# Patient Record
Sex: FEMALE | Race: WHITE
[De-identification: names, ages, dates, MRNs, and addresses within clinical notes are randomized per-mention and may not be internally consistent; named-entity substitution may affect disease eponyms.]

---

## 2017-06-15 ENCOUNTER — APPOINTMENT (OUTPATIENT)
Dept: INTERNAL MEDICINE | Facility: CLINIC | Age: 32
End: 2017-06-15

## 2017-06-15 ENCOUNTER — OUTPATIENT (OUTPATIENT)
Dept: OUTPATIENT SERVICES | Facility: HOSPITAL | Age: 32
LOS: 1 days | Discharge: HOME | End: 2017-06-15

## 2017-06-15 VITALS
BODY MASS INDEX: 26.96 KG/M2 | HEIGHT: 69 IN | SYSTOLIC BLOOD PRESSURE: 132 MMHG | HEART RATE: 91 BPM | DIASTOLIC BLOOD PRESSURE: 86 MMHG | WEIGHT: 182 LBS

## 2017-06-15 DIAGNOSIS — F43.10 POST-TRAUMATIC STRESS DISORDER, UNSPECIFIED: ICD-10-CM

## 2017-06-15 DIAGNOSIS — F31.9 BIPOLAR DISORDER, UNSPECIFIED: ICD-10-CM

## 2017-06-15 DIAGNOSIS — F41.9 ANXIETY DISORDER, UNSPECIFIED: ICD-10-CM

## 2017-06-15 DIAGNOSIS — Z78.9 OTHER SPECIFIED HEALTH STATUS: ICD-10-CM

## 2017-06-15 DIAGNOSIS — F11.20 OPIOID DEPENDENCE, UNCOMPLICATED: ICD-10-CM

## 2017-06-15 DIAGNOSIS — F17.200 NICOTINE DEPENDENCE, UNSPECIFIED, UNCOMPLICATED: ICD-10-CM

## 2017-06-15 DIAGNOSIS — Z87.898 PERSONAL HISTORY OF OTHER SPECIFIED CONDITIONS: ICD-10-CM

## 2017-06-28 DIAGNOSIS — I51.7 CARDIOMEGALY: ICD-10-CM

## 2017-11-03 ENCOUNTER — OUTPATIENT (OUTPATIENT)
Dept: OUTPATIENT SERVICES | Facility: HOSPITAL | Age: 32
LOS: 1 days | Discharge: HOME | End: 2017-11-03

## 2017-11-03 DIAGNOSIS — F31.9 BIPOLAR DISORDER, UNSPECIFIED: ICD-10-CM

## 2017-11-03 DIAGNOSIS — F17.200 NICOTINE DEPENDENCE, UNSPECIFIED, UNCOMPLICATED: ICD-10-CM

## 2017-11-03 DIAGNOSIS — F43.10 POST-TRAUMATIC STRESS DISORDER, UNSPECIFIED: ICD-10-CM

## 2017-11-03 DIAGNOSIS — F11.20 OPIOID DEPENDENCE, UNCOMPLICATED: ICD-10-CM

## 2017-11-03 DIAGNOSIS — F41.9 ANXIETY DISORDER, UNSPECIFIED: ICD-10-CM

## 2017-11-03 DIAGNOSIS — Z00.8 ENCOUNTER FOR OTHER GENERAL EXAMINATION: ICD-10-CM

## 2017-11-29 ENCOUNTER — OUTPATIENT (OUTPATIENT)
Dept: OUTPATIENT SERVICES | Facility: HOSPITAL | Age: 32
LOS: 1 days | Discharge: HOME | End: 2017-11-29

## 2017-11-29 DIAGNOSIS — F17.200 NICOTINE DEPENDENCE, UNSPECIFIED, UNCOMPLICATED: ICD-10-CM

## 2017-11-29 DIAGNOSIS — I49.9 CARDIAC ARRHYTHMIA, UNSPECIFIED: ICD-10-CM

## 2017-11-29 DIAGNOSIS — F43.10 POST-TRAUMATIC STRESS DISORDER, UNSPECIFIED: ICD-10-CM

## 2017-11-29 DIAGNOSIS — F11.20 OPIOID DEPENDENCE, UNCOMPLICATED: ICD-10-CM

## 2017-11-29 DIAGNOSIS — F41.9 ANXIETY DISORDER, UNSPECIFIED: ICD-10-CM

## 2017-11-29 DIAGNOSIS — F31.9 BIPOLAR DISORDER, UNSPECIFIED: ICD-10-CM

## 2018-01-11 ENCOUNTER — OUTPATIENT (OUTPATIENT)
Dept: OUTPATIENT SERVICES | Facility: HOSPITAL | Age: 33
LOS: 1 days | Discharge: HOME | End: 2018-01-11

## 2018-01-11 ENCOUNTER — APPOINTMENT (OUTPATIENT)
Dept: INTERNAL MEDICINE | Facility: CLINIC | Age: 33
End: 2018-01-11

## 2018-01-11 VITALS
SYSTOLIC BLOOD PRESSURE: 121 MMHG | HEIGHT: 69 IN | WEIGHT: 180 LBS | BODY MASS INDEX: 26.66 KG/M2 | DIASTOLIC BLOOD PRESSURE: 77 MMHG | HEART RATE: 93 BPM

## 2018-01-11 DIAGNOSIS — F11.20 OPIOID DEPENDENCE, UNCOMPLICATED: ICD-10-CM

## 2018-01-11 DIAGNOSIS — R94.31 ABNORMAL ELECTROCARDIOGRAM [ECG] [EKG]: ICD-10-CM

## 2018-01-11 DIAGNOSIS — F31.9 BIPOLAR DISORDER, UNSPECIFIED: ICD-10-CM

## 2018-01-11 DIAGNOSIS — F43.10 POST-TRAUMATIC STRESS DISORDER, UNSPECIFIED: ICD-10-CM

## 2018-01-11 DIAGNOSIS — F17.200 NICOTINE DEPENDENCE, UNSPECIFIED, UNCOMPLICATED: ICD-10-CM

## 2018-01-11 DIAGNOSIS — F41.9 ANXIETY DISORDER, UNSPECIFIED: ICD-10-CM

## 2018-10-19 ENCOUNTER — OUTPATIENT (OUTPATIENT)
Dept: OUTPATIENT SERVICES | Facility: HOSPITAL | Age: 33
LOS: 1 days | Discharge: HOME | End: 2018-10-19

## 2018-10-19 DIAGNOSIS — Z00.8 ENCOUNTER FOR OTHER GENERAL EXAMINATION: ICD-10-CM

## 2018-10-26 ENCOUNTER — OUTPATIENT (OUTPATIENT)
Dept: OUTPATIENT SERVICES | Facility: HOSPITAL | Age: 33
LOS: 1 days | Discharge: HOME | End: 2018-10-26

## 2018-10-26 DIAGNOSIS — I49.9 CARDIAC ARRHYTHMIA, UNSPECIFIED: ICD-10-CM

## 2018-10-26 LAB
ALBUMIN SERPL ELPH-MCNC: 4.3 G/DL — SIGNIFICANT CHANGE UP (ref 3.5–5.2)
ALP SERPL-CCNC: 46 U/L — SIGNIFICANT CHANGE UP (ref 30–115)
ALT FLD-CCNC: 16 U/L — SIGNIFICANT CHANGE UP (ref 0–41)
ANION GAP SERPL CALC-SCNC: 15 MMOL/L — HIGH (ref 7–14)
APPEARANCE UR: CLEAR — SIGNIFICANT CHANGE UP
AST SERPL-CCNC: 15 U/L — SIGNIFICANT CHANGE UP (ref 0–41)
BILIRUB DIRECT SERPL-MCNC: <0.2 MG/DL — SIGNIFICANT CHANGE UP (ref 0–0.2)
BILIRUB INDIRECT FLD-MCNC: 0 MG/DL — SIGNIFICANT CHANGE UP (ref 0.2–1.2)
BILIRUB SERPL-MCNC: <0.2 MG/DL — SIGNIFICANT CHANGE UP (ref 0.2–1.2)
BILIRUB UR-MCNC: NEGATIVE — SIGNIFICANT CHANGE UP
BUN SERPL-MCNC: 17 MG/DL — SIGNIFICANT CHANGE UP (ref 10–20)
CALCIUM SERPL-MCNC: 8.7 MG/DL — SIGNIFICANT CHANGE UP (ref 8.5–10.1)
CHLORIDE SERPL-SCNC: 105 MMOL/L — SIGNIFICANT CHANGE UP (ref 98–110)
CHOLEST SERPL-MCNC: 196 MG/DL — SIGNIFICANT CHANGE UP (ref 100–200)
CO2 SERPL-SCNC: 22 MMOL/L — SIGNIFICANT CHANGE UP (ref 17–32)
COLOR SPEC: YELLOW — SIGNIFICANT CHANGE UP
CREAT SERPL-MCNC: 0.8 MG/DL — SIGNIFICANT CHANGE UP (ref 0.7–1.5)
DIFF PNL FLD: NEGATIVE — SIGNIFICANT CHANGE UP
ESTIMATED AVERAGE GLUCOSE: 108 MG/DL — SIGNIFICANT CHANGE UP (ref 68–114)
GLUCOSE SERPL-MCNC: 103 MG/DL — HIGH (ref 70–99)
GLUCOSE UR QL: NEGATIVE MG/DL — SIGNIFICANT CHANGE UP
HBA1C BLD-MCNC: 5.4 % — SIGNIFICANT CHANGE UP (ref 4–5.6)
HCG UR QL: NEGATIVE — SIGNIFICANT CHANGE UP
HCT VFR BLD CALC: 32.3 % — LOW (ref 37–47)
HDLC SERPL-MCNC: 59 MG/DL — SIGNIFICANT CHANGE UP
HGB BLD-MCNC: 10.1 G/DL — LOW (ref 12–16)
KETONES UR-MCNC: ABNORMAL
LEUKOCYTE ESTERASE UR-ACNC: NEGATIVE — SIGNIFICANT CHANGE UP
LIPID PNL WITH DIRECT LDL SERPL: 120 MG/DL — SIGNIFICANT CHANGE UP (ref 4–129)
MAGNESIUM SERPL-MCNC: 1.9 MG/DL — SIGNIFICANT CHANGE UP (ref 1.8–2.4)
MCHC RBC-ENTMCNC: 25.1 PG — LOW (ref 27–31)
MCHC RBC-ENTMCNC: 31.3 G/DL — LOW (ref 32–37)
MCV RBC AUTO: 80.3 FL — LOW (ref 81–99)
NITRITE UR-MCNC: NEGATIVE — SIGNIFICANT CHANGE UP
NRBC # BLD: 0 /100 WBCS — SIGNIFICANT CHANGE UP (ref 0–0)
PH UR: 5.5 — SIGNIFICANT CHANGE UP (ref 5–8)
PLATELET # BLD AUTO: 299 K/UL — SIGNIFICANT CHANGE UP (ref 130–400)
POTASSIUM SERPL-MCNC: 3.9 MMOL/L — SIGNIFICANT CHANGE UP (ref 3.5–5)
POTASSIUM SERPL-SCNC: 3.9 MMOL/L — SIGNIFICANT CHANGE UP (ref 3.5–5)
PROT SERPL-MCNC: 7 G/DL — SIGNIFICANT CHANGE UP (ref 6–8)
PROT UR-MCNC: NEGATIVE MG/DL — SIGNIFICANT CHANGE UP
RBC # BLD: 4.02 M/UL — LOW (ref 4.2–5.4)
RBC # FLD: 15 % — HIGH (ref 11.5–14.5)
SODIUM SERPL-SCNC: 142 MMOL/L — SIGNIFICANT CHANGE UP (ref 135–146)
SP GR SPEC: >=1.03 (ref 1.01–1.03)
TOTAL CHOLESTEROL/HDL RATIO MEASUREMENT: 3.3 RATIO — LOW (ref 4–5.5)
TRIGL SERPL-MCNC: 133 MG/DL — SIGNIFICANT CHANGE UP (ref 10–149)
UROBILINOGEN FLD QL: 0.2 MG/DL — SIGNIFICANT CHANGE UP (ref 0.2–0.2)
WBC # BLD: 7.68 K/UL — SIGNIFICANT CHANGE UP (ref 4.8–10.8)
WBC # FLD AUTO: 7.68 K/UL — SIGNIFICANT CHANGE UP (ref 4.8–10.8)

## 2018-10-27 LAB
HAV IGM SER-ACNC: SIGNIFICANT CHANGE UP
HBV CORE IGM SER-ACNC: SIGNIFICANT CHANGE UP
HBV SURFACE AG SER-ACNC: SIGNIFICANT CHANGE UP
HCV AB S/CO SERPL IA: 0.2 S/CO — SIGNIFICANT CHANGE UP
HCV AB SERPL-IMP: SIGNIFICANT CHANGE UP
T PALLIDUM AB TITR SER: NEGATIVE — SIGNIFICANT CHANGE UP

## 2018-11-12 ENCOUNTER — OUTPATIENT (OUTPATIENT)
Dept: OUTPATIENT SERVICES | Facility: HOSPITAL | Age: 33
LOS: 1 days | Discharge: HOME | End: 2018-11-12

## 2018-11-12 DIAGNOSIS — I49.9 CARDIAC ARRHYTHMIA, UNSPECIFIED: ICD-10-CM

## 2018-12-20 ENCOUNTER — APPOINTMENT (OUTPATIENT)
Dept: INTERNAL MEDICINE | Facility: CLINIC | Age: 33
End: 2018-12-20

## 2018-12-20 ENCOUNTER — OUTPATIENT (OUTPATIENT)
Dept: OUTPATIENT SERVICES | Facility: HOSPITAL | Age: 33
LOS: 1 days | Discharge: HOME | End: 2018-12-20

## 2018-12-20 VITALS
SYSTOLIC BLOOD PRESSURE: 126 MMHG | TEMPERATURE: 98.3 F | HEART RATE: 94 BPM | WEIGHT: 166 LBS | HEIGHT: 69 IN | DIASTOLIC BLOOD PRESSURE: 80 MMHG | BODY MASS INDEX: 24.59 KG/M2

## 2018-12-20 DIAGNOSIS — I51.7 CARDIOMEGALY: ICD-10-CM

## 2018-12-20 DIAGNOSIS — Z00.00 ENCOUNTER FOR GENERAL ADULT MEDICAL EXAMINATION W/OUT ABNORMAL FINDINGS: ICD-10-CM

## 2018-12-20 DIAGNOSIS — F11.20 OPIOID DEPENDENCE, UNCOMPLICATED: ICD-10-CM

## 2018-12-20 NOTE — ASSESSMENT
[FreeTextEntry1] : 32 female with history of opioid abuse on methadone presenting for routine f/u. Last seen on 01/11/2018 for EKG abnormalities. Patient currently denies any complaints.\par \par #) LVH\par -patient saw cardiologist 2 years, nothing to do as patient is asymptomatic. Get Echo. \par \par #) H/o opioid abuse\par -on methadone, goes to clinic\par \par #) HCM\par -BP- 126/80\par -flu shot refused\par -pap smear UTD- 2018\par -syphilis screening- negative\par -HIV screening sent\par -cbc, cmp (10/26/2018)- wnl\par -lipid profile (10/26/2018)- wnl\par -A1c (10/26/2018)- 5.4\par -Hep A, B, C- negative\par -routine b/w ordered- a1c, lipid profile, tsh, and vitamin d\par -RTC in 1 year

## 2018-12-20 NOTE — PHYSICAL EXAM
[Normal] : no CVA tenderness, no spinal tenderness [de-identified] : denies homicidal/ suicidal ideations

## 2018-12-20 NOTE — HISTORY OF PRESENT ILLNESS
[de-identified] : 32 female with history of opioid abuse on methadone presenting for routine f/u. Last seen on 01/11/2018 for EKG abnormalities. Patient currently denies any complaints.\par \par Patient denies any fevers, chills, night sweats, and unintentional weight loss. Patient denies any chest pain, shortness of breath, and palpitations. Patient denies any nausea, vomiting, and diarrhea.

## 2019-10-18 ENCOUNTER — OUTPATIENT (OUTPATIENT)
Dept: OUTPATIENT SERVICES | Facility: HOSPITAL | Age: 34
LOS: 1 days | Discharge: HOME | End: 2019-10-18

## 2019-10-18 DIAGNOSIS — Z00.8 ENCOUNTER FOR OTHER GENERAL EXAMINATION: ICD-10-CM

## 2019-11-01 LAB
ALBUMIN SERPL ELPH-MCNC: 4.6 G/DL — SIGNIFICANT CHANGE UP (ref 3.5–5.2)
ALP SERPL-CCNC: 44 U/L — SIGNIFICANT CHANGE UP (ref 30–115)
ALT FLD-CCNC: 9 U/L — SIGNIFICANT CHANGE UP (ref 0–41)
ANION GAP SERPL CALC-SCNC: 13 MMOL/L — SIGNIFICANT CHANGE UP (ref 7–14)
APPEARANCE UR: CLEAR — SIGNIFICANT CHANGE UP
AST SERPL-CCNC: 14 U/L — SIGNIFICANT CHANGE UP (ref 0–41)
BILIRUB SERPL-MCNC: <0.2 MG/DL — SIGNIFICANT CHANGE UP (ref 0.2–1.2)
BILIRUB UR-MCNC: NEGATIVE — SIGNIFICANT CHANGE UP
BUN SERPL-MCNC: 10 MG/DL — SIGNIFICANT CHANGE UP (ref 10–20)
CALCIUM SERPL-MCNC: 8.4 MG/DL — LOW (ref 8.5–10.1)
CHLORIDE SERPL-SCNC: 102 MMOL/L — SIGNIFICANT CHANGE UP (ref 98–110)
CHOLEST SERPL-MCNC: 194 MG/DL — SIGNIFICANT CHANGE UP (ref 100–200)
CO2 SERPL-SCNC: 25 MMOL/L — SIGNIFICANT CHANGE UP (ref 17–32)
COLOR SPEC: YELLOW — SIGNIFICANT CHANGE UP
CREAT SERPL-MCNC: 0.8 MG/DL — SIGNIFICANT CHANGE UP (ref 0.7–1.5)
DIFF PNL FLD: NEGATIVE — SIGNIFICANT CHANGE UP
GLUCOSE SERPL-MCNC: 109 MG/DL — HIGH (ref 70–99)
GLUCOSE UR QL: NEGATIVE MG/DL — SIGNIFICANT CHANGE UP
HCG UR QL: NEGATIVE — SIGNIFICANT CHANGE UP
HCT VFR BLD CALC: 33 % — LOW (ref 37–47)
HDLC SERPL-MCNC: 59 MG/DL — SIGNIFICANT CHANGE UP
HGB BLD-MCNC: 10.1 G/DL — LOW (ref 12–16)
KETONES UR-MCNC: NEGATIVE — SIGNIFICANT CHANGE UP
LEUKOCYTE ESTERASE UR-ACNC: NEGATIVE — SIGNIFICANT CHANGE UP
LIPID PNL WITH DIRECT LDL SERPL: 104 MG/DL — SIGNIFICANT CHANGE UP (ref 4–129)
MAGNESIUM SERPL-MCNC: 2 MG/DL — SIGNIFICANT CHANGE UP (ref 1.8–2.4)
MCHC RBC-ENTMCNC: 24.8 PG — LOW (ref 27–31)
MCHC RBC-ENTMCNC: 30.6 G/DL — LOW (ref 32–37)
MCV RBC AUTO: 81.1 FL — SIGNIFICANT CHANGE UP (ref 81–99)
NITRITE UR-MCNC: NEGATIVE — SIGNIFICANT CHANGE UP
NRBC # BLD: 0 /100 WBCS — SIGNIFICANT CHANGE UP (ref 0–0)
PH UR: 6.5 — SIGNIFICANT CHANGE UP (ref 5–8)
PLATELET # BLD AUTO: 276 K/UL — SIGNIFICANT CHANGE UP (ref 130–400)
POTASSIUM SERPL-MCNC: 3.6 MMOL/L — SIGNIFICANT CHANGE UP (ref 3.5–5)
POTASSIUM SERPL-SCNC: 3.6 MMOL/L — SIGNIFICANT CHANGE UP (ref 3.5–5)
PROT SERPL-MCNC: 7.3 G/DL — SIGNIFICANT CHANGE UP (ref 6–8)
PROT UR-MCNC: NEGATIVE MG/DL — SIGNIFICANT CHANGE UP
RBC # BLD: 4.07 M/UL — LOW (ref 4.2–5.4)
RBC # FLD: 15.8 % — HIGH (ref 11.5–14.5)
SODIUM SERPL-SCNC: 140 MMOL/L — SIGNIFICANT CHANGE UP (ref 135–146)
SP GR SPEC: 1.01 — SIGNIFICANT CHANGE UP (ref 1.01–1.03)
TOTAL CHOLESTEROL/HDL RATIO MEASUREMENT: 3.3 RATIO — LOW (ref 4–5.5)
TRIGL SERPL-MCNC: 299 MG/DL — HIGH (ref 10–149)
UROBILINOGEN FLD QL: 0.2 MG/DL — SIGNIFICANT CHANGE UP (ref 0.2–0.2)
WBC # BLD: 5.65 K/UL — SIGNIFICANT CHANGE UP (ref 4.8–10.8)
WBC # FLD AUTO: 5.65 K/UL — SIGNIFICANT CHANGE UP (ref 4.8–10.8)

## 2019-11-02 LAB
HAV IGM SER-ACNC: SIGNIFICANT CHANGE UP
HBV CORE IGM SER-ACNC: SIGNIFICANT CHANGE UP
HBV SURFACE AG SER-ACNC: SIGNIFICANT CHANGE UP
HCV AB S/CO SERPL IA: 0.22 S/CO — SIGNIFICANT CHANGE UP (ref 0–0.99)
HCV AB SERPL-IMP: SIGNIFICANT CHANGE UP
T PALLIDUM AB TITR SER: NEGATIVE — SIGNIFICANT CHANGE UP

## 2019-11-05 LAB
ESTIMATED AVERAGE GLUCOSE: 105 MG/DL — SIGNIFICANT CHANGE UP (ref 68–114)
HBA1C BLD-MCNC: 5.3 % — SIGNIFICANT CHANGE UP (ref 4–5.6)

## 2019-11-15 ENCOUNTER — OUTPATIENT (OUTPATIENT)
Dept: OUTPATIENT SERVICES | Facility: HOSPITAL | Age: 34
LOS: 1 days | Discharge: HOME | End: 2019-11-15

## 2019-11-15 DIAGNOSIS — I49.9 CARDIAC ARRHYTHMIA, UNSPECIFIED: ICD-10-CM

## 2020-10-16 ENCOUNTER — OUTPATIENT (OUTPATIENT)
Dept: OUTPATIENT SERVICES | Facility: HOSPITAL | Age: 35
LOS: 1 days | Discharge: HOME | End: 2020-10-16

## 2020-10-16 DIAGNOSIS — Z00.8 ENCOUNTER FOR OTHER GENERAL EXAMINATION: ICD-10-CM

## 2020-10-29 LAB
A1C WITH ESTIMATED AVERAGE GLUCOSE RESULT: 5.4 % — SIGNIFICANT CHANGE UP (ref 4–5.6)
ALBUMIN SERPL ELPH-MCNC: 4.5 G/DL — SIGNIFICANT CHANGE UP (ref 3.5–5.2)
ALP SERPL-CCNC: 44 U/L — SIGNIFICANT CHANGE UP (ref 30–115)
ALT FLD-CCNC: 9 U/L — SIGNIFICANT CHANGE UP (ref 0–41)
ANION GAP SERPL CALC-SCNC: 12 MMOL/L — SIGNIFICANT CHANGE UP (ref 7–14)
APPEARANCE UR: CLEAR — SIGNIFICANT CHANGE UP
AST SERPL-CCNC: 13 U/L — SIGNIFICANT CHANGE UP (ref 0–41)
BILIRUB SERPL-MCNC: <0.2 MG/DL — SIGNIFICANT CHANGE UP (ref 0.2–1.2)
BILIRUB UR-MCNC: NEGATIVE — SIGNIFICANT CHANGE UP
BUN SERPL-MCNC: 12 MG/DL — SIGNIFICANT CHANGE UP (ref 10–20)
CALCIUM SERPL-MCNC: 9.1 MG/DL — SIGNIFICANT CHANGE UP (ref 8.5–10.1)
CHLORIDE SERPL-SCNC: 100 MMOL/L — SIGNIFICANT CHANGE UP (ref 98–110)
CHOLEST SERPL-MCNC: 216 MG/DL — HIGH
CO2 SERPL-SCNC: 26 MMOL/L — SIGNIFICANT CHANGE UP (ref 17–32)
COLOR SPEC: YELLOW — SIGNIFICANT CHANGE UP
CREAT SERPL-MCNC: 0.8 MG/DL — SIGNIFICANT CHANGE UP (ref 0.7–1.5)
DIFF PNL FLD: NEGATIVE — SIGNIFICANT CHANGE UP
ESTIMATED AVERAGE GLUCOSE: 108 MG/DL — SIGNIFICANT CHANGE UP (ref 68–114)
GLUCOSE SERPL-MCNC: 102 MG/DL — HIGH (ref 70–99)
GLUCOSE UR QL: NEGATIVE MG/DL — SIGNIFICANT CHANGE UP
HCG UR QL: NEGATIVE — SIGNIFICANT CHANGE UP
HCT VFR BLD CALC: 31.8 % — LOW (ref 37–47)
HDLC SERPL-MCNC: 61 MG/DL — SIGNIFICANT CHANGE UP
HGB BLD-MCNC: 9.4 G/DL — LOW (ref 12–16)
KETONES UR-MCNC: NEGATIVE — SIGNIFICANT CHANGE UP
LEUKOCYTE ESTERASE UR-ACNC: NEGATIVE — SIGNIFICANT CHANGE UP
LIPID PNL WITH DIRECT LDL SERPL: 137 MG/DL — HIGH
MAGNESIUM SERPL-MCNC: 1.9 MG/DL — SIGNIFICANT CHANGE UP (ref 1.8–2.4)
MCHC RBC-ENTMCNC: 23.6 PG — LOW (ref 27–31)
MCHC RBC-ENTMCNC: 29.6 G/DL — LOW (ref 32–37)
MCV RBC AUTO: 79.7 FL — LOW (ref 81–99)
NITRITE UR-MCNC: NEGATIVE — SIGNIFICANT CHANGE UP
NON HDL CHOLESTEROL: 155 MG/DL — HIGH
NRBC # BLD: 0 /100 WBCS — SIGNIFICANT CHANGE UP (ref 0–0)
PH UR: 6 — SIGNIFICANT CHANGE UP (ref 5–8)
PLATELET # BLD AUTO: 277 K/UL — SIGNIFICANT CHANGE UP (ref 130–400)
POTASSIUM SERPL-MCNC: 3.7 MMOL/L — SIGNIFICANT CHANGE UP (ref 3.5–5)
POTASSIUM SERPL-SCNC: 3.7 MMOL/L — SIGNIFICANT CHANGE UP (ref 3.5–5)
PROT SERPL-MCNC: 6.9 G/DL — SIGNIFICANT CHANGE UP (ref 6–8)
PROT UR-MCNC: NEGATIVE MG/DL — SIGNIFICANT CHANGE UP
RBC # BLD: 3.99 M/UL — LOW (ref 4.2–5.4)
RBC # FLD: 15.5 % — HIGH (ref 11.5–14.5)
SODIUM SERPL-SCNC: 138 MMOL/L — SIGNIFICANT CHANGE UP (ref 135–146)
SP GR SPEC: 1.02 — SIGNIFICANT CHANGE UP (ref 1.01–1.03)
TRIGL SERPL-MCNC: 143 MG/DL — SIGNIFICANT CHANGE UP
UROBILINOGEN FLD QL: 0.2 MG/DL — SIGNIFICANT CHANGE UP (ref 0.2–0.2)
WBC # BLD: 5.24 K/UL — SIGNIFICANT CHANGE UP (ref 4.8–10.8)
WBC # FLD AUTO: 5.24 K/UL — SIGNIFICANT CHANGE UP (ref 4.8–10.8)

## 2020-10-30 LAB
HAV IGM SER-ACNC: SIGNIFICANT CHANGE UP
HBV CORE IGM SER-ACNC: SIGNIFICANT CHANGE UP
HBV SURFACE AG SER-ACNC: SIGNIFICANT CHANGE UP
HCV AB S/CO SERPL IA: 0.1 S/CO — SIGNIFICANT CHANGE UP (ref 0–0.99)
HCV AB SERPL-IMP: SIGNIFICANT CHANGE UP

## 2020-10-31 LAB
GAMMA INTERFERON BACKGROUND BLD IA-ACNC: 0.01 IU/ML — SIGNIFICANT CHANGE UP
M TB IFN-G BLD-IMP: NEGATIVE — SIGNIFICANT CHANGE UP
M TB IFN-G CD4+ BCKGRND COR BLD-ACNC: 0 IU/ML — SIGNIFICANT CHANGE UP
M TB IFN-G CD4+CD8+ BCKGRND COR BLD-ACNC: 0 IU/ML — SIGNIFICANT CHANGE UP
QUANT TB PLUS MITOGEN MINUS NIL: 2.03 IU/ML — SIGNIFICANT CHANGE UP
T PALLIDUM AB TITR SER: NEGATIVE — SIGNIFICANT CHANGE UP

## 2020-11-11 ENCOUNTER — OUTPATIENT (OUTPATIENT)
Dept: OUTPATIENT SERVICES | Facility: HOSPITAL | Age: 35
LOS: 1 days | Discharge: HOME | End: 2020-11-11

## 2020-11-11 DIAGNOSIS — I49.9 CARDIAC ARRHYTHMIA, UNSPECIFIED: ICD-10-CM

## 2021-08-21 ENCOUNTER — EMERGENCY (EMERGENCY)
Facility: HOSPITAL | Age: 36
LOS: 0 days | Discharge: HOME | End: 2021-08-21
Attending: EMERGENCY MEDICINE | Admitting: EMERGENCY MEDICINE
Payer: SELF-PAY

## 2021-08-21 VITALS
HEART RATE: 94 BPM | TEMPERATURE: 100 F | HEIGHT: 69 IN | OXYGEN SATURATION: 100 % | WEIGHT: 175.05 LBS | SYSTOLIC BLOOD PRESSURE: 126 MMHG | RESPIRATION RATE: 18 BRPM | DIASTOLIC BLOOD PRESSURE: 66 MMHG

## 2021-08-21 DIAGNOSIS — Z20.822 CONTACT WITH AND (SUSPECTED) EXPOSURE TO COVID-19: ICD-10-CM

## 2021-08-21 DIAGNOSIS — R50.9 FEVER, UNSPECIFIED: ICD-10-CM

## 2021-08-21 DIAGNOSIS — Z86.16 PERSONAL HISTORY OF COVID-19: ICD-10-CM

## 2021-08-21 DIAGNOSIS — R11.2 NAUSEA WITH VOMITING, UNSPECIFIED: ICD-10-CM

## 2021-08-21 DIAGNOSIS — M79.10 MYALGIA, UNSPECIFIED SITE: ICD-10-CM

## 2021-08-21 LAB — SARS-COV-2 RNA SPEC QL NAA+PROBE: SIGNIFICANT CHANGE UP

## 2021-08-21 PROCEDURE — 99284 EMERGENCY DEPT VISIT MOD MDM: CPT

## 2021-08-21 RX ORDER — ACETAMINOPHEN 500 MG
650 TABLET ORAL ONCE
Refills: 0 | Status: DISCONTINUED | OUTPATIENT
Start: 2021-08-21 | End: 2021-08-21

## 2021-08-21 RX ORDER — SODIUM CHLORIDE 9 MG/ML
1000 INJECTION, SOLUTION INTRAVENOUS ONCE
Refills: 0 | Status: COMPLETED | OUTPATIENT
Start: 2021-08-21 | End: 2021-08-21

## 2021-08-21 RX ORDER — ONDANSETRON 8 MG/1
4 TABLET, FILM COATED ORAL ONCE
Refills: 0 | Status: COMPLETED | OUTPATIENT
Start: 2021-08-21 | End: 2021-08-21

## 2021-08-21 RX ORDER — KETOROLAC TROMETHAMINE 30 MG/ML
15 SYRINGE (ML) INJECTION ONCE
Refills: 0 | Status: DISCONTINUED | OUTPATIENT
Start: 2021-08-21 | End: 2021-08-21

## 2021-08-21 RX ORDER — ONDANSETRON 8 MG/1
4 TABLET, FILM COATED ORAL ONCE
Refills: 0 | Status: DISCONTINUED | OUTPATIENT
Start: 2021-08-21 | End: 2021-08-21

## 2021-08-21 RX ADMIN — ONDANSETRON 4 MILLIGRAM(S): 8 TABLET, FILM COATED ORAL at 02:18

## 2021-08-21 RX ADMIN — SODIUM CHLORIDE 1000 MILLILITER(S): 9 INJECTION, SOLUTION INTRAVENOUS at 02:18

## 2021-08-21 RX ADMIN — Medication 15 MILLIGRAM(S): at 02:17

## 2021-08-21 NOTE — ED PROVIDER NOTE - PROGRESS NOTE DETAILS
CP: Patient lying comfortable in bed and reports feeling better. CP: Patient feels better. Given strict return precautions. Patient verbalized understanding and is agreeable to plan.

## 2021-08-21 NOTE — ED PROVIDER NOTE - CARE PROVIDER_API CALL
Compa Roque)  Geriatric Medicine; Internal Medicine  [address] [place] [phone]  Phone: [phone]  Fax: [phone]  Follow Up Time: 1-3 Days

## 2021-08-21 NOTE — ED PROVIDER NOTE - NSFOLLOWUPINSTRUCTIONS_ED_ALL_ED_FT
COVID-19 Vaccine (1 Dose)    WHAT YOU NEED TO KNOW:    What do I need to know about the COVID-19 1-dose vaccine? The COVID-19 vaccine is a shot given to help prevent infection caused by the novel coronavirus. It can be given to adults 18 years or older. This vaccine has emergency use authorization. This means it is not officially approved but is being given because the benefits outweigh the risks. The vaccine does not contain the virus that causes COVID-19. This means you are not at risk for getting COVID-19 from the vaccine. Instead, the vaccine teaches your immune system to recognize the virus and produce antibodies to fight it. Your healthcare provider will tell you when the vaccine is available to you.    How is the vaccine given? The vaccine is given as 1 shot into a muscle in your shoulder area. Your provider will monitor you for about 15 minutes after the shot is given.    What are reasons I should wait to get the COVID-19 vaccine?   •You are sick or have a fever.      •You had COVID-19 and received monoclonal antibodies or convalescent plasma. Wait at least 90 days after the end of treatment to get the vaccine.      What do I need to tell my healthcare provider before I get the vaccine?   •You received a dose of a different COVID-19 vaccine. Your provider may recommend you get a second dose of the other vaccine instead to complete a 2-dose schedule.      •You have an allergy to any component (part) of the vaccine.      •You have a history of allergies.      •You have a bleeding disorder or take blood thinning medicine.      •Your immune system is weakened from a medical condition or from medicines such as chemotherapy or steroids.      •You know or think you are pregnant. Your provider may recommend you get the vaccine during pregnancy if you are at high risk for COVID-19. He or she may instead want you to wait until after your baby is born. Do not get a COVID-19 vaccine until you and your provider decide it is right for you.      •You are breastfeeding.      What happens after I get the vaccine? You will be considered fully vaccinated 2 weeks after you get the vaccine. This is how long your immune system needs to build a response strong enough to protect you. The following are guidelines to follow when you are fully vaccinated:  •Continue being careful until experts are sure a fully vaccinated person cannot get infected or pass the virus to others. Wear a face covering and stay 6 feet away from others when you are in public. Remember to wash your hands often.      •It is considered safe to gather indoors with others who are also fully vaccinated. Face coverings and social distancing are not needed in this case.      •If you are exposed to the virus, you do not need to isolate or get tested unless you develop symptoms.      What are the risks of the vaccine? This is a new vaccine. All side effects may not be known. The following are possible side effects:  •You may have an allergic reaction to the vaccine. The vaccine can cause a life-threatening allergic reaction called anaphylaxis. This is rare.      •The vaccine may cause mild symptoms, such as a fever, chills, headache, and muscle aches. You may also be tired, have nausea, or develop a fever.      •The area where you got the shot may be swollen, sore, red, or tender. This is usually mild and goes away in a few hours. You can apply a warm compress to the area. It may also help to move your arm around.      •The vaccine may not be as helpful if your immune system is weak.      •You may still get infected with the coronavirus after you receive the vaccine.      What else can I do to prevent coronavirus infection? Droplets are the most common way all coronaviruses spread. The virus spreads from person to person through talking, coughing, and sneezing. The best way to prevent infection is to avoid anyone who is infected, but this can be hard to do. An infected person can spread the virus before signs or symptoms begin, or even if signs or symptoms never develop. Continue to do the following to keep yourself and others safe, even if you have had the vaccination:    Prevent COVID-19 Infection     •A face covering is recommended when you are in public, such as grocery stores, pharmacies, and on mass transit. You may live in an area that has a face covering mandate. This means you must wear a covering in public. Check the laws in your area before you leave your home. Bring extra coverings with you.  How to Wear a Face Covering (Mask)           •Follow worldwide, national, and local social distancing guidelines. Social distancing means avoiding close physical contact so the virus cannot spread from one person to another. Keep at least 6 feet (2 meters) between you and others who do not live in your house. Also keep this distance from anyone who comes to your home, such as someone making a delivery.      •Wash your hands often throughout the day. Use hand  that contains alcohol if soap and water are not available. Do not touch your eyes, nose, or mouth without washing your hands first. Teach children how to wash their hands and use hand .  Handwashing           Call your local emergency number (911 in the [place]) if:   •You have signs of a severe allergic reaction, such as trouble breathing, hives, or wheezing.      •Your mouth and throat are swollen.      •You have chest pain or your heart is beating faster than normal for you.      •Your face is red or swollen.      •You have hives that spread over your body.      When should I call my doctor or the place where I got the vaccine?   •You have a fever.      •You have any other signs or symptoms that concern you or do not go away.      •You have increased pain, redness, or swelling around the area where the shot was given.      •You have questions or concerns about the COVID-19 vaccine.      CARE AGREEMENT:    You have the right to help plan your care. Learn about your health condition and how it may be treated. Discuss treatment options with your healthcare providers to decide what care you want to receive. You always have the right to refuse treatment.

## 2021-08-21 NOTE — ED PROVIDER NOTE - CLINICAL SUMMARY MEDICAL DECISION MAKING FREE TEXT BOX
35y female h/o coivd in march 2021 with f/c/myalgias/n/v 4d after first covid vax, PE as above, will provide supportive care, routine swab (unvaccinated bf with hot/cold flashes)

## 2021-08-21 NOTE — ED ADULT NURSE NOTE - NSIMPLEMENTINTERV_GEN_ALL_ED
Implemented All Universal Safety Interventions:  [place] to call system. Call bell, personal items and telephone within reach. Instruct patient to call for assistance. Room bathroom lighting operational. Non-slip footwear when patient is off stretcher. Physically safe environment: no spills, clutter or unnecessary equipment. Stretcher in lowest position, wheels locked, appropriate side rails in place.

## 2021-08-21 NOTE — ED ADULT NURSE NOTE - ALCOHOL PRE SCREEN (AUDIT - C)
"-- DO NOT REPLY / DO NOT REPLY ALL --  -- Message is from the [employer]--    COVID-19 Universal Screening: N/A - Not about scheduling    General Patient Message      Reason for Call: Patient is requesting a referral for Dr. Taylor Rosas for a colonoscopy. Please contact the patient. Caller Information       Type Contact Phone    10/01/2020 02:19 PM CDT Phone (Incoming) Janelle Mildred (Self) [phone] (H)          Alternative phone number: none    Turnaround time given to caller: ""This message will be sent to [facility] Provider's name]. The clinical team will fulfill your request as soon as they review your message. \""    " Statement Selected

## 2021-08-21 NOTE — ED PROVIDER NOTE - OBJECTIVE STATEMENT
36 yo female, no PMHx, presents with body aches x3 days. She received her first dose of Pfizer covid vaccine 3 days prior and has been feeling body aches, Tmax 100F, alleviated with Tylenol, no aggravating factors, associated with nausea and single episode of NBNB vomiting today. Denies chest pain, shortness of breath, headache, dizziness, abdominal pain, dysuria, diarrhea, sick contacts. 34 yo female, no PMHx, presents with body aches x3 days. She received her first dose of Pfizer covid vaccine 3 days prior and has been feeling body aches, Tmax 100F, alleviated with Tylenol, no aggravating factors, associated with nausea and single episode of NBNB vomiting today. Patient had covid 3/2021. Denies chest pain, shortness of breath, headache, dizziness, abdominal pain, dysuria, diarrhea, sick contacts.

## 2021-08-21 NOTE — ED PROVIDER NOTE - PATIENT PORTAL LINK FT
You can access the FollowMyHealth Patient Portal offered by [facility] by registering at the following website: [email or web address]. By joining [employer]’s FollowMyHealth portal, you will also be able to view your health information using other applications (apps) compatible with our system.

## 2021-08-21 NOTE — ED PROVIDER NOTE - NS ED ROS FT
GEN:  no fever, +chills, no generalized weakness, +body aches  NEURO:  no headache, no dizziness  ENT: no sore throat, no runny nose  CV:  no chest pain, no palpitations  RESP:  no sob, no cough  GI:  +nausea, +vomiting, no abdominal pain, no diarrhea  :  no dysuria, no urinary frequency, no hematuria  MSK:  no joint pain, no edema  SKIN:  no rash, no bruising

## 2021-09-17 ENCOUNTER — OUTPATIENT (OUTPATIENT)
Dept: OUTPATIENT SERVICES | Facility: HOSPITAL | Age: 36
LOS: 1 days | Discharge: HOME | End: 2021-09-17

## 2021-09-17 DIAGNOSIS — Z00.8 ENCOUNTER FOR OTHER GENERAL EXAMINATION: ICD-10-CM

## 2021-09-17 PROBLEM — Z78.9 OTHER SPECIFIED HEALTH STATUS: Chronic | Status: ACTIVE | Noted: 2021-08-21

## 2021-09-30 LAB
A1C WITH ESTIMATED AVERAGE GLUCOSE RESULT: 5.4 % — SIGNIFICANT CHANGE UP (ref 4–5.6)
ALBUMIN SERPL ELPH-MCNC: 4.4 G/DL — SIGNIFICANT CHANGE UP (ref 3.5–5.2)
ALP SERPL-CCNC: 41 U/L — SIGNIFICANT CHANGE UP (ref 30–115)
ALT FLD-CCNC: 11 U/L — SIGNIFICANT CHANGE UP (ref 0–41)
ANION GAP SERPL CALC-SCNC: 12 MMOL/L — SIGNIFICANT CHANGE UP (ref 7–14)
APPEARANCE UR: CLEAR — SIGNIFICANT CHANGE UP
AST SERPL-CCNC: 14 U/L — SIGNIFICANT CHANGE UP (ref 0–41)
BACTERIA # UR AUTO: ABNORMAL
BILIRUB SERPL-MCNC: <0.2 MG/DL — SIGNIFICANT CHANGE UP (ref 0.2–1.2)
BILIRUB UR-MCNC: NEGATIVE — SIGNIFICANT CHANGE UP
BUN SERPL-MCNC: 15 MG/DL — SIGNIFICANT CHANGE UP (ref 10–20)
CALCIUM SERPL-MCNC: 9 MG/DL — SIGNIFICANT CHANGE UP (ref 8.5–10.1)
CHLORIDE SERPL-SCNC: 101 MMOL/L — SIGNIFICANT CHANGE UP (ref 98–110)
CHOLEST SERPL-MCNC: 178 MG/DL — SIGNIFICANT CHANGE UP
CO2 SERPL-SCNC: 24 MMOL/L — SIGNIFICANT CHANGE UP (ref 17–32)
COD CRY URNS QL: NEGATIVE — SIGNIFICANT CHANGE UP
COLOR SPEC: YELLOW — SIGNIFICANT CHANGE UP
CREAT SERPL-MCNC: 0.9 MG/DL — SIGNIFICANT CHANGE UP (ref 0.7–1.5)
DIFF PNL FLD: NEGATIVE — SIGNIFICANT CHANGE UP
EPI CELLS # UR: ABNORMAL /HPF
ESTIMATED AVERAGE GLUCOSE: 108 MG/DL — SIGNIFICANT CHANGE UP (ref 68–114)
GLUCOSE SERPL-MCNC: 126 MG/DL — HIGH (ref 70–99)
GLUCOSE UR QL: NEGATIVE MG/DL — SIGNIFICANT CHANGE UP
GRAN CASTS # UR COMP ASSIST: NEGATIVE — SIGNIFICANT CHANGE UP
HCG UR QL: NEGATIVE — SIGNIFICANT CHANGE UP
HCT VFR BLD CALC: 30.5 % — LOW (ref 37–47)
HDLC SERPL-MCNC: 66 MG/DL — SIGNIFICANT CHANGE UP
HGB BLD-MCNC: 8.7 G/DL — LOW (ref 12–16)
HYALINE CASTS # UR AUTO: NEGATIVE — SIGNIFICANT CHANGE UP
KETONES UR-MCNC: NEGATIVE — SIGNIFICANT CHANGE UP
LEUKOCYTE ESTERASE UR-ACNC: ABNORMAL
LIPID PNL WITH DIRECT LDL SERPL: 102 MG/DL — HIGH
MAGNESIUM SERPL-MCNC: 2.2 MG/DL — SIGNIFICANT CHANGE UP (ref 1.8–2.4)
MCHC RBC-ENTMCNC: 21.8 PG — LOW (ref 27–31)
MCHC RBC-ENTMCNC: 28.5 G/DL — LOW (ref 32–37)
MCV RBC AUTO: 76.3 FL — LOW (ref 81–99)
NITRITE UR-MCNC: NEGATIVE — SIGNIFICANT CHANGE UP
NON HDL CHOLESTEROL: 112 MG/DL — SIGNIFICANT CHANGE UP
NRBC # BLD: 0 /100 WBCS — SIGNIFICANT CHANGE UP (ref 0–0)
PH UR: 6 — SIGNIFICANT CHANGE UP (ref 5–8)
PLATELET # BLD AUTO: 292 K/UL — SIGNIFICANT CHANGE UP (ref 130–400)
POTASSIUM SERPL-MCNC: 4.1 MMOL/L — SIGNIFICANT CHANGE UP (ref 3.5–5)
POTASSIUM SERPL-SCNC: 4.1 MMOL/L — SIGNIFICANT CHANGE UP (ref 3.5–5)
PROT SERPL-MCNC: 7.1 G/DL — SIGNIFICANT CHANGE UP (ref 6–8)
PROT UR-MCNC: NEGATIVE MG/DL — SIGNIFICANT CHANGE UP
RBC # BLD: 4 M/UL — LOW (ref 4.2–5.4)
RBC # FLD: 16.9 % — HIGH (ref 11.5–14.5)
RBC CASTS # UR COMP ASSIST: NEGATIVE — SIGNIFICANT CHANGE UP
SODIUM SERPL-SCNC: 137 MMOL/L — SIGNIFICANT CHANGE UP (ref 135–146)
SP GR SPEC: 1.02 — SIGNIFICANT CHANGE UP (ref 1.01–1.03)
TRI-PHOS CRY UR QL COMP ASSIST: NEGATIVE — SIGNIFICANT CHANGE UP
TRIGL SERPL-MCNC: 77 MG/DL — SIGNIFICANT CHANGE UP
URATE CRY FLD QL MICRO: NEGATIVE — SIGNIFICANT CHANGE UP
UROBILINOGEN FLD QL: 0.2 MG/DL — SIGNIFICANT CHANGE UP
WBC # BLD: 6.3 K/UL — SIGNIFICANT CHANGE UP (ref 4.8–10.8)
WBC # FLD AUTO: 6.3 K/UL — SIGNIFICANT CHANGE UP (ref 4.8–10.8)
WBC UR QL: SIGNIFICANT CHANGE UP /HPF

## 2021-10-01 LAB
HAV IGM SER-ACNC: SIGNIFICANT CHANGE UP
HBV CORE IGM SER-ACNC: SIGNIFICANT CHANGE UP
HBV SURFACE AG SER-ACNC: SIGNIFICANT CHANGE UP
HCV AB S/CO SERPL IA: 0.18 S/CO — SIGNIFICANT CHANGE UP (ref 0–0.99)
HCV AB SERPL-IMP: SIGNIFICANT CHANGE UP
T PALLIDUM AB TITR SER: NEGATIVE — SIGNIFICANT CHANGE UP

## 2021-10-02 LAB
GAMMA INTERFERON BACKGROUND BLD IA-ACNC: 0.01 IU/ML — SIGNIFICANT CHANGE UP
M TB IFN-G BLD-IMP: NEGATIVE — SIGNIFICANT CHANGE UP
M TB IFN-G CD4+ BCKGRND COR BLD-ACNC: 0 IU/ML — SIGNIFICANT CHANGE UP
M TB IFN-G CD4+CD8+ BCKGRND COR BLD-ACNC: 0 IU/ML — SIGNIFICANT CHANGE UP
QUANT TB PLUS MITOGEN MINUS NIL: 2.98 IU/ML — SIGNIFICANT CHANGE UP

## 2021-11-12 ENCOUNTER — OUTPATIENT (OUTPATIENT)
Dept: OUTPATIENT SERVICES | Facility: HOSPITAL | Age: 36
LOS: 1 days | Discharge: HOME | End: 2021-11-12

## 2021-11-12 DIAGNOSIS — I49.9 CARDIAC ARRHYTHMIA, UNSPECIFIED: ICD-10-CM

## 2021-11-26 ENCOUNTER — OUTPATIENT (OUTPATIENT)
Dept: OUTPATIENT SERVICES | Facility: HOSPITAL | Age: 36
LOS: 1 days | Discharge: HOME | End: 2021-11-26
Payer: COMMERCIAL

## 2021-11-26 DIAGNOSIS — I49.9 CARDIAC ARRHYTHMIA, UNSPECIFIED: ICD-10-CM

## 2021-11-26 PROCEDURE — 93010 ELECTROCARDIOGRAM REPORT: CPT

## 2022-09-30 ENCOUNTER — OUTPATIENT (OUTPATIENT)
Dept: OUTPATIENT SERVICES | Facility: HOSPITAL | Age: 37
LOS: 1 days | Discharge: HOME | End: 2022-09-30

## 2022-09-30 DIAGNOSIS — Z00.8 ENCOUNTER FOR OTHER GENERAL EXAMINATION: ICD-10-CM

## 2022-10-31 ENCOUNTER — OUTPATIENT (OUTPATIENT)
Dept: OUTPATIENT SERVICES | Facility: HOSPITAL | Age: 37
LOS: 1 days | Discharge: HOME | End: 2022-10-31

## 2022-10-31 DIAGNOSIS — Z00.8 ENCOUNTER FOR OTHER GENERAL EXAMINATION: ICD-10-CM

## 2022-10-31 LAB
A1C WITH ESTIMATED AVERAGE GLUCOSE RESULT: 5 % — SIGNIFICANT CHANGE UP (ref 4–5.6)
ALBUMIN SERPL ELPH-MCNC: 4.4 G/DL — SIGNIFICANT CHANGE UP (ref 3.5–5.2)
ALP SERPL-CCNC: 41 U/L — SIGNIFICANT CHANGE UP (ref 30–115)
ALT FLD-CCNC: 26 U/L — SIGNIFICANT CHANGE UP (ref 0–41)
ANION GAP SERPL CALC-SCNC: 13 MMOL/L — SIGNIFICANT CHANGE UP (ref 7–14)
APPEARANCE UR: CLEAR — SIGNIFICANT CHANGE UP
AST SERPL-CCNC: 26 U/L — SIGNIFICANT CHANGE UP (ref 0–41)
BILIRUB SERPL-MCNC: <0.2 MG/DL — SIGNIFICANT CHANGE UP (ref 0.2–1.2)
BILIRUB UR-MCNC: NEGATIVE — SIGNIFICANT CHANGE UP
BUN SERPL-MCNC: 21 MG/DL — HIGH (ref 10–20)
CALCIUM SERPL-MCNC: 9.1 MG/DL — SIGNIFICANT CHANGE UP (ref 8.4–10.5)
CHLORIDE SERPL-SCNC: 101 MMOL/L — SIGNIFICANT CHANGE UP (ref 98–110)
CHOLEST SERPL-MCNC: 175 MG/DL — SIGNIFICANT CHANGE UP
CO2 SERPL-SCNC: 25 MMOL/L — SIGNIFICANT CHANGE UP (ref 17–32)
COLOR SPEC: YELLOW — SIGNIFICANT CHANGE UP
CREAT SERPL-MCNC: 0.9 MG/DL — SIGNIFICANT CHANGE UP (ref 0.7–1.5)
DIFF PNL FLD: NEGATIVE — SIGNIFICANT CHANGE UP
EGFR: 86 ML/MIN/1.73M2 — SIGNIFICANT CHANGE UP
ESTIMATED AVERAGE GLUCOSE: 97 MG/DL — SIGNIFICANT CHANGE UP (ref 68–114)
GLUCOSE SERPL-MCNC: 104 MG/DL — HIGH (ref 70–99)
GLUCOSE UR QL: NEGATIVE MG/DL — SIGNIFICANT CHANGE UP
HCG UR QL: NEGATIVE — SIGNIFICANT CHANGE UP
HCT VFR BLD CALC: 36.4 % — LOW (ref 37–47)
HDLC SERPL-MCNC: 71 MG/DL — SIGNIFICANT CHANGE UP
HGB BLD-MCNC: 11.6 G/DL — LOW (ref 12–16)
KETONES UR-MCNC: NEGATIVE — SIGNIFICANT CHANGE UP
LEUKOCYTE ESTERASE UR-ACNC: NEGATIVE — SIGNIFICANT CHANGE UP
LIPID PNL WITH DIRECT LDL SERPL: 89 MG/DL — SIGNIFICANT CHANGE UP
MAGNESIUM SERPL-MCNC: 2 MG/DL — SIGNIFICANT CHANGE UP (ref 1.8–2.4)
MCHC RBC-ENTMCNC: 28.4 PG — SIGNIFICANT CHANGE UP (ref 27–31)
MCHC RBC-ENTMCNC: 31.9 G/DL — LOW (ref 32–37)
MCV RBC AUTO: 89.2 FL — SIGNIFICANT CHANGE UP (ref 81–99)
NITRITE UR-MCNC: NEGATIVE — SIGNIFICANT CHANGE UP
NON HDL CHOLESTEROL: 104 MG/DL — SIGNIFICANT CHANGE UP
NRBC # BLD: 0 /100 WBCS — SIGNIFICANT CHANGE UP (ref 0–0)
PH UR: 6.5 — SIGNIFICANT CHANGE UP (ref 5–8)
PLATELET # BLD AUTO: 239 K/UL — SIGNIFICANT CHANGE UP (ref 130–400)
POTASSIUM SERPL-MCNC: 4.6 MMOL/L — SIGNIFICANT CHANGE UP (ref 3.5–5)
POTASSIUM SERPL-SCNC: 4.6 MMOL/L — SIGNIFICANT CHANGE UP (ref 3.5–5)
PROT SERPL-MCNC: 6.7 G/DL — SIGNIFICANT CHANGE UP (ref 6–8)
PROT UR-MCNC: NEGATIVE MG/DL — SIGNIFICANT CHANGE UP
RBC # BLD: 4.08 M/UL — LOW (ref 4.2–5.4)
RBC # FLD: 15 % — HIGH (ref 11.5–14.5)
SODIUM SERPL-SCNC: 139 MMOL/L — SIGNIFICANT CHANGE UP (ref 135–146)
SP GR SPEC: 1.02 — SIGNIFICANT CHANGE UP (ref 1.01–1.03)
TRIGL SERPL-MCNC: 74 MG/DL — SIGNIFICANT CHANGE UP
UROBILINOGEN FLD QL: 0.2 MG/DL — SIGNIFICANT CHANGE UP
WBC # BLD: 6.08 K/UL — SIGNIFICANT CHANGE UP (ref 4.8–10.8)
WBC # FLD AUTO: 6.08 K/UL — SIGNIFICANT CHANGE UP (ref 4.8–10.8)

## 2022-11-03 LAB
GAMMA INTERFERON BACKGROUND BLD IA-ACNC: 0.05 IU/ML — SIGNIFICANT CHANGE UP
M TB IFN-G BLD-IMP: NEGATIVE — SIGNIFICANT CHANGE UP
M TB IFN-G CD4+ BCKGRND COR BLD-ACNC: 0 IU/ML — SIGNIFICANT CHANGE UP
M TB IFN-G CD4+CD8+ BCKGRND COR BLD-ACNC: 0 IU/ML — SIGNIFICANT CHANGE UP
QUANT TB PLUS MITOGEN MINUS NIL: 2.64 IU/ML — SIGNIFICANT CHANGE UP

## 2022-11-25 ENCOUNTER — OUTPATIENT (OUTPATIENT)
Dept: OUTPATIENT SERVICES | Facility: HOSPITAL | Age: 37
LOS: 1 days | Discharge: HOME | End: 2022-11-25

## 2022-11-25 DIAGNOSIS — I49.9 CARDIAC ARRHYTHMIA, UNSPECIFIED: ICD-10-CM

## 2022-11-25 PROCEDURE — 93010 ELECTROCARDIOGRAM REPORT: CPT

## 2023-10-27 ENCOUNTER — OUTPATIENT (OUTPATIENT)
Dept: OUTPATIENT SERVICES | Facility: HOSPITAL | Age: 38
LOS: 1 days | End: 2023-10-27
Payer: MEDICAID

## 2023-10-27 DIAGNOSIS — Z00.8 ENCOUNTER FOR OTHER GENERAL EXAMINATION: ICD-10-CM

## 2023-10-27 LAB
A1C WITH ESTIMATED AVERAGE GLUCOSE RESULT: 5.5 % — SIGNIFICANT CHANGE UP (ref 4–5.6)
A1C WITH ESTIMATED AVERAGE GLUCOSE RESULT: 5.5 % — SIGNIFICANT CHANGE UP (ref 4–5.6)
ALBUMIN SERPL ELPH-MCNC: 4.6 G/DL — SIGNIFICANT CHANGE UP (ref 3.5–5.2)
ALBUMIN SERPL ELPH-MCNC: 4.6 G/DL — SIGNIFICANT CHANGE UP (ref 3.5–5.2)
ALP SERPL-CCNC: 37 U/L — SIGNIFICANT CHANGE UP (ref 30–115)
ALP SERPL-CCNC: 37 U/L — SIGNIFICANT CHANGE UP (ref 30–115)
ALT FLD-CCNC: 11 U/L — SIGNIFICANT CHANGE UP (ref 0–41)
ALT FLD-CCNC: 11 U/L — SIGNIFICANT CHANGE UP (ref 0–41)
ANION GAP SERPL CALC-SCNC: 12 MMOL/L — SIGNIFICANT CHANGE UP (ref 7–14)
ANION GAP SERPL CALC-SCNC: 12 MMOL/L — SIGNIFICANT CHANGE UP (ref 7–14)
AST SERPL-CCNC: 17 U/L — SIGNIFICANT CHANGE UP (ref 0–41)
AST SERPL-CCNC: 17 U/L — SIGNIFICANT CHANGE UP (ref 0–41)
BILIRUB SERPL-MCNC: 0.4 MG/DL — SIGNIFICANT CHANGE UP (ref 0.2–1.2)
BILIRUB SERPL-MCNC: 0.4 MG/DL — SIGNIFICANT CHANGE UP (ref 0.2–1.2)
BUN SERPL-MCNC: 12 MG/DL — SIGNIFICANT CHANGE UP (ref 10–20)
BUN SERPL-MCNC: 12 MG/DL — SIGNIFICANT CHANGE UP (ref 10–20)
CALCIUM SERPL-MCNC: 9.6 MG/DL — SIGNIFICANT CHANGE UP (ref 8.4–10.5)
CALCIUM SERPL-MCNC: 9.6 MG/DL — SIGNIFICANT CHANGE UP (ref 8.4–10.5)
CHLORIDE SERPL-SCNC: 102 MMOL/L — SIGNIFICANT CHANGE UP (ref 98–110)
CHLORIDE SERPL-SCNC: 102 MMOL/L — SIGNIFICANT CHANGE UP (ref 98–110)
CHOLEST SERPL-MCNC: 152 MG/DL — SIGNIFICANT CHANGE UP
CHOLEST SERPL-MCNC: 152 MG/DL — SIGNIFICANT CHANGE UP
CO2 SERPL-SCNC: 25 MMOL/L — SIGNIFICANT CHANGE UP (ref 17–32)
CO2 SERPL-SCNC: 25 MMOL/L — SIGNIFICANT CHANGE UP (ref 17–32)
CREAT SERPL-MCNC: 0.9 MG/DL — SIGNIFICANT CHANGE UP (ref 0.7–1.5)
CREAT SERPL-MCNC: 0.9 MG/DL — SIGNIFICANT CHANGE UP (ref 0.7–1.5)
EGFR: 86 ML/MIN/1.73M2 — SIGNIFICANT CHANGE UP
EGFR: 86 ML/MIN/1.73M2 — SIGNIFICANT CHANGE UP
ESTIMATED AVERAGE GLUCOSE: 111 MG/DL — SIGNIFICANT CHANGE UP (ref 68–114)
ESTIMATED AVERAGE GLUCOSE: 111 MG/DL — SIGNIFICANT CHANGE UP (ref 68–114)
GLUCOSE SERPL-MCNC: 115 MG/DL — HIGH (ref 70–99)
GLUCOSE SERPL-MCNC: 115 MG/DL — HIGH (ref 70–99)
HCG UR QL: NEGATIVE — SIGNIFICANT CHANGE UP
HCG UR QL: NEGATIVE — SIGNIFICANT CHANGE UP
HCT VFR BLD CALC: 38.7 % — SIGNIFICANT CHANGE UP (ref 37–47)
HCT VFR BLD CALC: 38.7 % — SIGNIFICANT CHANGE UP (ref 37–47)
HDLC SERPL-MCNC: 65 MG/DL — SIGNIFICANT CHANGE UP
HDLC SERPL-MCNC: 65 MG/DL — SIGNIFICANT CHANGE UP
HGB BLD-MCNC: 12.6 G/DL — SIGNIFICANT CHANGE UP (ref 12–16)
HGB BLD-MCNC: 12.6 G/DL — SIGNIFICANT CHANGE UP (ref 12–16)
LIPID PNL WITH DIRECT LDL SERPL: 72 MG/DL — SIGNIFICANT CHANGE UP
LIPID PNL WITH DIRECT LDL SERPL: 72 MG/DL — SIGNIFICANT CHANGE UP
MAGNESIUM SERPL-MCNC: 2 MG/DL — SIGNIFICANT CHANGE UP (ref 1.8–2.4)
MAGNESIUM SERPL-MCNC: 2 MG/DL — SIGNIFICANT CHANGE UP (ref 1.8–2.4)
MCHC RBC-ENTMCNC: 29.6 PG — SIGNIFICANT CHANGE UP (ref 27–31)
MCHC RBC-ENTMCNC: 29.6 PG — SIGNIFICANT CHANGE UP (ref 27–31)
MCHC RBC-ENTMCNC: 32.6 G/DL — SIGNIFICANT CHANGE UP (ref 32–37)
MCHC RBC-ENTMCNC: 32.6 G/DL — SIGNIFICANT CHANGE UP (ref 32–37)
MCV RBC AUTO: 91.1 FL — SIGNIFICANT CHANGE UP (ref 81–99)
MCV RBC AUTO: 91.1 FL — SIGNIFICANT CHANGE UP (ref 81–99)
NON HDL CHOLESTEROL: 87 MG/DL — SIGNIFICANT CHANGE UP
NON HDL CHOLESTEROL: 87 MG/DL — SIGNIFICANT CHANGE UP
NRBC # BLD: 0 /100 WBCS — SIGNIFICANT CHANGE UP (ref 0–0)
NRBC # BLD: 0 /100 WBCS — SIGNIFICANT CHANGE UP (ref 0–0)
PLATELET # BLD AUTO: 249 K/UL — SIGNIFICANT CHANGE UP (ref 130–400)
PLATELET # BLD AUTO: 249 K/UL — SIGNIFICANT CHANGE UP (ref 130–400)
PMV BLD: 9.7 FL — SIGNIFICANT CHANGE UP (ref 7.4–10.4)
PMV BLD: 9.7 FL — SIGNIFICANT CHANGE UP (ref 7.4–10.4)
POTASSIUM SERPL-MCNC: 3.8 MMOL/L — SIGNIFICANT CHANGE UP (ref 3.5–5)
POTASSIUM SERPL-MCNC: 3.8 MMOL/L — SIGNIFICANT CHANGE UP (ref 3.5–5)
POTASSIUM SERPL-SCNC: 3.8 MMOL/L — SIGNIFICANT CHANGE UP (ref 3.5–5)
POTASSIUM SERPL-SCNC: 3.8 MMOL/L — SIGNIFICANT CHANGE UP (ref 3.5–5)
PROT SERPL-MCNC: 7.1 G/DL — SIGNIFICANT CHANGE UP (ref 6–8)
PROT SERPL-MCNC: 7.1 G/DL — SIGNIFICANT CHANGE UP (ref 6–8)
RBC # BLD: 4.25 M/UL — SIGNIFICANT CHANGE UP (ref 4.2–5.4)
RBC # BLD: 4.25 M/UL — SIGNIFICANT CHANGE UP (ref 4.2–5.4)
RBC # FLD: 12.5 % — SIGNIFICANT CHANGE UP (ref 11.5–14.5)
RBC # FLD: 12.5 % — SIGNIFICANT CHANGE UP (ref 11.5–14.5)
SODIUM SERPL-SCNC: 139 MMOL/L — SIGNIFICANT CHANGE UP (ref 135–146)
SODIUM SERPL-SCNC: 139 MMOL/L — SIGNIFICANT CHANGE UP (ref 135–146)
TRIGL SERPL-MCNC: 74 MG/DL — SIGNIFICANT CHANGE UP
TRIGL SERPL-MCNC: 74 MG/DL — SIGNIFICANT CHANGE UP
WBC # BLD: 4.39 K/UL — LOW (ref 4.8–10.8)
WBC # BLD: 4.39 K/UL — LOW (ref 4.8–10.8)
WBC # FLD AUTO: 4.39 K/UL — LOW (ref 4.8–10.8)
WBC # FLD AUTO: 4.39 K/UL — LOW (ref 4.8–10.8)

## 2023-10-27 PROCEDURE — 85027 COMPLETE CBC AUTOMATED: CPT

## 2023-10-27 PROCEDURE — 81003 URINALYSIS AUTO W/O SCOPE: CPT

## 2023-10-27 PROCEDURE — 80061 LIPID PANEL: CPT

## 2023-10-27 PROCEDURE — 83735 ASSAY OF MAGNESIUM: CPT

## 2023-10-27 PROCEDURE — 86480 TB TEST CELL IMMUN MEASURE: CPT

## 2023-10-27 PROCEDURE — 81025 URINE PREGNANCY TEST: CPT

## 2023-10-27 PROCEDURE — 83036 HEMOGLOBIN GLYCOSYLATED A1C: CPT

## 2023-10-27 PROCEDURE — 80074 ACUTE HEPATITIS PANEL: CPT

## 2023-10-27 PROCEDURE — 86780 TREPONEMA PALLIDUM: CPT

## 2023-10-27 PROCEDURE — 80053 COMPREHEN METABOLIC PANEL: CPT

## 2023-10-28 DIAGNOSIS — Z00.8 ENCOUNTER FOR OTHER GENERAL EXAMINATION: ICD-10-CM

## 2023-10-28 LAB
APPEARANCE UR: CLEAR — SIGNIFICANT CHANGE UP
APPEARANCE UR: CLEAR — SIGNIFICANT CHANGE UP
BILIRUB UR-MCNC: NEGATIVE — SIGNIFICANT CHANGE UP
BILIRUB UR-MCNC: NEGATIVE — SIGNIFICANT CHANGE UP
COLOR SPEC: YELLOW — SIGNIFICANT CHANGE UP
COLOR SPEC: YELLOW — SIGNIFICANT CHANGE UP
DIFF PNL FLD: NEGATIVE — SIGNIFICANT CHANGE UP
DIFF PNL FLD: NEGATIVE — SIGNIFICANT CHANGE UP
GLUCOSE UR QL: NEGATIVE MG/DL — SIGNIFICANT CHANGE UP
GLUCOSE UR QL: NEGATIVE MG/DL — SIGNIFICANT CHANGE UP
HAV IGM SER-ACNC: SIGNIFICANT CHANGE UP
HAV IGM SER-ACNC: SIGNIFICANT CHANGE UP
HBV CORE IGM SER-ACNC: SIGNIFICANT CHANGE UP
HBV CORE IGM SER-ACNC: SIGNIFICANT CHANGE UP
HBV SURFACE AG SER-ACNC: SIGNIFICANT CHANGE UP
HBV SURFACE AG SER-ACNC: SIGNIFICANT CHANGE UP
HCV AB S/CO SERPL IA: 0.1 S/CO — SIGNIFICANT CHANGE UP (ref 0–0.99)
HCV AB S/CO SERPL IA: 0.1 S/CO — SIGNIFICANT CHANGE UP (ref 0–0.99)
HCV AB SERPL-IMP: SIGNIFICANT CHANGE UP
HCV AB SERPL-IMP: SIGNIFICANT CHANGE UP
KETONES UR-MCNC: NEGATIVE MG/DL — SIGNIFICANT CHANGE UP
KETONES UR-MCNC: NEGATIVE MG/DL — SIGNIFICANT CHANGE UP
LEUKOCYTE ESTERASE UR-ACNC: NEGATIVE — SIGNIFICANT CHANGE UP
LEUKOCYTE ESTERASE UR-ACNC: NEGATIVE — SIGNIFICANT CHANGE UP
NITRITE UR-MCNC: NEGATIVE — SIGNIFICANT CHANGE UP
NITRITE UR-MCNC: NEGATIVE — SIGNIFICANT CHANGE UP
PH UR: 6 — SIGNIFICANT CHANGE UP (ref 5–8)
PH UR: 6 — SIGNIFICANT CHANGE UP (ref 5–8)
PROT UR-MCNC: NEGATIVE MG/DL — SIGNIFICANT CHANGE UP
PROT UR-MCNC: NEGATIVE MG/DL — SIGNIFICANT CHANGE UP
SP GR SPEC: 1.02 — SIGNIFICANT CHANGE UP (ref 1–1.03)
SP GR SPEC: 1.02 — SIGNIFICANT CHANGE UP (ref 1–1.03)
T PALLIDUM AB TITR SER: NEGATIVE — SIGNIFICANT CHANGE UP
T PALLIDUM AB TITR SER: NEGATIVE — SIGNIFICANT CHANGE UP
UROBILINOGEN FLD QL: 0.2 MG/DL — SIGNIFICANT CHANGE UP (ref 0.2–1)
UROBILINOGEN FLD QL: 0.2 MG/DL — SIGNIFICANT CHANGE UP (ref 0.2–1)

## 2023-10-31 LAB
GAMMA INTERFERON BACKGROUND BLD IA-ACNC: 0.02 IU/ML — SIGNIFICANT CHANGE UP
GAMMA INTERFERON BACKGROUND BLD IA-ACNC: 0.02 IU/ML — SIGNIFICANT CHANGE UP
M TB IFN-G BLD-IMP: ABNORMAL
M TB IFN-G BLD-IMP: ABNORMAL
M TB IFN-G CD4+ BCKGRND COR BLD-ACNC: 0 IU/ML — SIGNIFICANT CHANGE UP
M TB IFN-G CD4+ BCKGRND COR BLD-ACNC: 0 IU/ML — SIGNIFICANT CHANGE UP
M TB IFN-G CD4+CD8+ BCKGRND COR BLD-ACNC: 0 IU/ML — SIGNIFICANT CHANGE UP
M TB IFN-G CD4+CD8+ BCKGRND COR BLD-ACNC: 0 IU/ML — SIGNIFICANT CHANGE UP
QUANT TB PLUS MITOGEN MINUS NIL: 0.35 IU/ML — SIGNIFICANT CHANGE UP
QUANT TB PLUS MITOGEN MINUS NIL: 0.35 IU/ML — SIGNIFICANT CHANGE UP

## 2023-11-24 ENCOUNTER — OUTPATIENT (OUTPATIENT)
Dept: OUTPATIENT SERVICES | Facility: HOSPITAL | Age: 38
LOS: 1 days | End: 2023-11-24
Payer: MEDICAID

## 2023-11-24 DIAGNOSIS — I49.9 CARDIAC ARRHYTHMIA, UNSPECIFIED: ICD-10-CM

## 2023-11-24 PROCEDURE — 93010 ELECTROCARDIOGRAM REPORT: CPT

## 2023-11-24 PROCEDURE — 93005 ELECTROCARDIOGRAM TRACING: CPT

## 2023-11-25 DIAGNOSIS — I49.9 CARDIAC ARRHYTHMIA, UNSPECIFIED: ICD-10-CM

## 2024-02-24 ENCOUNTER — INPATIENT (INPATIENT)
Facility: HOSPITAL | Age: 39
LOS: 2 days | Discharge: HOME | DRG: 47 | End: 2024-02-27
Attending: INTERNAL MEDICINE | Admitting: STUDENT IN AN ORGANIZED HEALTH CARE EDUCATION/TRAINING PROGRAM
Payer: MEDICAID

## 2024-02-24 VITALS
WEIGHT: 160.06 LBS | SYSTOLIC BLOOD PRESSURE: 137 MMHG | TEMPERATURE: 98 F | DIASTOLIC BLOOD PRESSURE: 74 MMHG | HEART RATE: 99 BPM | RESPIRATION RATE: 19 BRPM | OXYGEN SATURATION: 99 %

## 2024-02-24 DIAGNOSIS — G45.9 TRANSIENT CEREBRAL ISCHEMIC ATTACK, UNSPECIFIED: ICD-10-CM

## 2024-02-24 LAB
ALBUMIN SERPL ELPH-MCNC: 4.3 G/DL — SIGNIFICANT CHANGE UP (ref 3.5–5.2)
ALP SERPL-CCNC: 51 U/L — SIGNIFICANT CHANGE UP (ref 30–115)
ALT FLD-CCNC: 12 U/L — SIGNIFICANT CHANGE UP (ref 0–41)
ANION GAP SERPL CALC-SCNC: 10 MMOL/L — SIGNIFICANT CHANGE UP (ref 7–14)
APTT BLD: 33.1 SEC — SIGNIFICANT CHANGE UP (ref 27–39.2)
AST SERPL-CCNC: 14 U/L — SIGNIFICANT CHANGE UP (ref 0–41)
BASOPHILS # BLD AUTO: 0.01 K/UL — SIGNIFICANT CHANGE UP (ref 0–0.2)
BASOPHILS NFR BLD AUTO: 0.2 % — SIGNIFICANT CHANGE UP (ref 0–1)
BILIRUB SERPL-MCNC: 0.2 MG/DL — SIGNIFICANT CHANGE UP (ref 0.2–1.2)
BUN SERPL-MCNC: 8 MG/DL — LOW (ref 10–20)
CALCIUM SERPL-MCNC: 9.4 MG/DL — SIGNIFICANT CHANGE UP (ref 8.4–10.5)
CHLORIDE SERPL-SCNC: 100 MMOL/L — SIGNIFICANT CHANGE UP (ref 98–110)
CO2 SERPL-SCNC: 28 MMOL/L — SIGNIFICANT CHANGE UP (ref 17–32)
CREAT SERPL-MCNC: 0.9 MG/DL — SIGNIFICANT CHANGE UP (ref 0.7–1.5)
EGFR: 85 ML/MIN/1.73M2 — SIGNIFICANT CHANGE UP
EOSINOPHIL # BLD AUTO: 0.08 K/UL — SIGNIFICANT CHANGE UP (ref 0–0.7)
EOSINOPHIL NFR BLD AUTO: 1.7 % — SIGNIFICANT CHANGE UP (ref 0–8)
GLUCOSE BLDC GLUCOMTR-MCNC: 129 MG/DL — HIGH (ref 70–99)
GLUCOSE SERPL-MCNC: 132 MG/DL — HIGH (ref 70–99)
HCT VFR BLD CALC: 38.4 % — SIGNIFICANT CHANGE UP (ref 37–47)
HGB BLD-MCNC: 13.5 G/DL — SIGNIFICANT CHANGE UP (ref 12–16)
IMM GRANULOCYTES NFR BLD AUTO: 0.2 % — SIGNIFICANT CHANGE UP (ref 0.1–0.3)
INR BLD: 0.97 RATIO — SIGNIFICANT CHANGE UP (ref 0.65–1.3)
LYMPHOCYTES # BLD AUTO: 1.13 K/UL — LOW (ref 1.2–3.4)
LYMPHOCYTES # BLD AUTO: 23.9 % — SIGNIFICANT CHANGE UP (ref 20.5–51.1)
MCHC RBC-ENTMCNC: 29.9 PG — SIGNIFICANT CHANGE UP (ref 27–31)
MCHC RBC-ENTMCNC: 35.2 G/DL — SIGNIFICANT CHANGE UP (ref 32–37)
MCV RBC AUTO: 85 FL — SIGNIFICANT CHANGE UP (ref 81–99)
MONOCYTES # BLD AUTO: 0.35 K/UL — SIGNIFICANT CHANGE UP (ref 0.1–0.6)
MONOCYTES NFR BLD AUTO: 7.4 % — SIGNIFICANT CHANGE UP (ref 1.7–9.3)
NEUTROPHILS # BLD AUTO: 3.14 K/UL — SIGNIFICANT CHANGE UP (ref 1.4–6.5)
NEUTROPHILS NFR BLD AUTO: 66.6 % — SIGNIFICANT CHANGE UP (ref 42.2–75.2)
NRBC # BLD: 0 /100 WBCS — SIGNIFICANT CHANGE UP (ref 0–0)
PLATELET # BLD AUTO: 172 K/UL — SIGNIFICANT CHANGE UP (ref 130–400)
PMV BLD: 9.4 FL — SIGNIFICANT CHANGE UP (ref 7.4–10.4)
POTASSIUM SERPL-MCNC: 4.4 MMOL/L — SIGNIFICANT CHANGE UP (ref 3.5–5)
POTASSIUM SERPL-SCNC: 4.4 MMOL/L — SIGNIFICANT CHANGE UP (ref 3.5–5)
PROT SERPL-MCNC: 7.1 G/DL — SIGNIFICANT CHANGE UP (ref 6–8)
PROTHROM AB SERPL-ACNC: 11.1 SEC — SIGNIFICANT CHANGE UP (ref 9.95–12.87)
RBC # BLD: 4.52 M/UL — SIGNIFICANT CHANGE UP (ref 4.2–5.4)
RBC # FLD: 12 % — SIGNIFICANT CHANGE UP (ref 11.5–14.5)
SODIUM SERPL-SCNC: 138 MMOL/L — SIGNIFICANT CHANGE UP (ref 135–146)
TROPONIN SAMPLING TIME: SIGNIFICANT CHANGE UP
TROPONIN T, HIGH SENSITIVITY RESULT: <6 NG/L — SIGNIFICANT CHANGE UP (ref 6–13)
WBC # BLD: 4.72 K/UL — LOW (ref 4.8–10.8)
WBC # FLD AUTO: 4.72 K/UL — LOW (ref 4.8–10.8)

## 2024-02-24 PROCEDURE — G0378: CPT

## 2024-02-24 PROCEDURE — 0225U NFCT DS DNA&RNA 21 SARSCOV2: CPT

## 2024-02-24 PROCEDURE — 97162 PT EVAL MOD COMPLEX 30 MIN: CPT | Mod: GP

## 2024-02-24 PROCEDURE — 99291 CRITICAL CARE FIRST HOUR: CPT

## 2024-02-24 PROCEDURE — 70450 CT HEAD/BRAIN W/O DYE: CPT | Mod: 26,59,MC

## 2024-02-24 PROCEDURE — 70496 CT ANGIOGRAPHY HEAD: CPT | Mod: 26,MC

## 2024-02-24 PROCEDURE — 85027 COMPLETE CBC AUTOMATED: CPT

## 2024-02-24 PROCEDURE — 83036 HEMOGLOBIN GLYCOSYLATED A1C: CPT

## 2024-02-24 PROCEDURE — 84443 ASSAY THYROID STIM HORMONE: CPT

## 2024-02-24 PROCEDURE — 92526 ORAL FUNCTION THERAPY: CPT | Mod: GN

## 2024-02-24 PROCEDURE — 85025 COMPLETE CBC W/AUTO DIFF WBC: CPT

## 2024-02-24 PROCEDURE — 70498 CT ANGIOGRAPHY NECK: CPT | Mod: 26,MC

## 2024-02-24 PROCEDURE — 81025 URINE PREGNANCY TEST: CPT

## 2024-02-24 PROCEDURE — 97165 OT EVAL LOW COMPLEX 30 MIN: CPT | Mod: GO

## 2024-02-24 PROCEDURE — 36415 COLL VENOUS BLD VENIPUNCTURE: CPT

## 2024-02-24 PROCEDURE — 83735 ASSAY OF MAGNESIUM: CPT

## 2024-02-24 PROCEDURE — 93005 ELECTROCARDIOGRAM TRACING: CPT

## 2024-02-24 PROCEDURE — 70540 MRI ORBIT/FACE/NECK W/O DYE: CPT | Mod: MC

## 2024-02-24 PROCEDURE — 80061 LIPID PANEL: CPT

## 2024-02-24 PROCEDURE — 80048 BASIC METABOLIC PNL TOTAL CA: CPT

## 2024-02-24 PROCEDURE — 80053 COMPREHEN METABOLIC PANEL: CPT

## 2024-02-24 PROCEDURE — 82962 GLUCOSE BLOOD TEST: CPT

## 2024-02-24 PROCEDURE — 99285 EMERGENCY DEPT VISIT HI MDM: CPT

## 2024-02-24 PROCEDURE — 70551 MRI BRAIN STEM W/O DYE: CPT | Mod: MC

## 2024-02-24 PROCEDURE — 0042T: CPT | Mod: MC

## 2024-02-24 RX ORDER — LANOLIN ALCOHOL/MO/W.PET/CERES
50 CREAM (GRAM) TOPICAL ONCE
Refills: 0 | Status: DISCONTINUED | OUTPATIENT
Start: 2024-02-24 | End: 2024-02-24

## 2024-02-24 RX ORDER — ATORVASTATIN CALCIUM 80 MG/1
80 TABLET, FILM COATED ORAL ONCE
Refills: 0 | Status: COMPLETED | OUTPATIENT
Start: 2024-02-24 | End: 2024-02-24

## 2024-02-24 RX ORDER — METHADONE HYDROCHLORIDE 40 MG/1
30 TABLET ORAL AT BEDTIME
Refills: 0 | Status: DISCONTINUED | OUTPATIENT
Start: 2024-02-25 | End: 2024-02-25

## 2024-02-24 RX ORDER — ACETAMINOPHEN 500 MG
650 TABLET ORAL EVERY 6 HOURS
Refills: 0 | Status: DISCONTINUED | OUTPATIENT
Start: 2024-02-24 | End: 2024-02-27

## 2024-02-24 RX ORDER — METHADONE HYDROCHLORIDE 40 MG/1
25 TABLET ORAL DAILY
Refills: 0 | Status: DISCONTINUED | OUTPATIENT
Start: 2024-02-24 | End: 2024-02-25

## 2024-02-24 RX ORDER — ATORVASTATIN CALCIUM 80 MG/1
80 TABLET, FILM COATED ORAL AT BEDTIME
Refills: 0 | Status: DISCONTINUED | OUTPATIENT
Start: 2024-02-24 | End: 2024-02-27

## 2024-02-24 RX ORDER — ASPIRIN/CALCIUM CARB/MAGNESIUM 324 MG
324 TABLET ORAL ONCE
Refills: 0 | Status: COMPLETED | OUTPATIENT
Start: 2024-02-24 | End: 2024-02-24

## 2024-02-24 RX ORDER — ONDANSETRON 8 MG/1
4 TABLET, FILM COATED ORAL EVERY 8 HOURS
Refills: 0 | Status: DISCONTINUED | OUTPATIENT
Start: 2024-02-24 | End: 2024-02-27

## 2024-02-24 RX ORDER — LANOLIN ALCOHOL/MO/W.PET/CERES
3 CREAM (GRAM) TOPICAL AT BEDTIME
Refills: 0 | Status: DISCONTINUED | OUTPATIENT
Start: 2024-02-24 | End: 2024-02-25

## 2024-02-24 RX ORDER — ASPIRIN/CALCIUM CARB/MAGNESIUM 324 MG
81 TABLET ORAL DAILY
Refills: 0 | Status: DISCONTINUED | OUTPATIENT
Start: 2024-02-24 | End: 2024-02-27

## 2024-02-24 RX ORDER — METHADONE HYDROCHLORIDE 40 MG/1
57 TABLET ORAL DAILY
Refills: 0 | Status: DISCONTINUED | OUTPATIENT
Start: 2024-02-24 | End: 2024-02-24

## 2024-02-24 RX ORDER — ENOXAPARIN SODIUM 100 MG/ML
40 INJECTION SUBCUTANEOUS EVERY 24 HOURS
Refills: 0 | Status: DISCONTINUED | OUTPATIENT
Start: 2024-02-24 | End: 2024-02-27

## 2024-02-24 RX ORDER — METHADONE HYDROCHLORIDE 40 MG/1
57 TABLET ORAL
Refills: 0 | DISCHARGE

## 2024-02-24 RX ORDER — LANOLIN ALCOHOL/MO/W.PET/CERES
20 CREAM (GRAM) TOPICAL AT BEDTIME
Refills: 0 | Status: COMPLETED | OUTPATIENT
Start: 2024-02-24 | End: 2024-02-24

## 2024-02-24 RX ADMIN — Medication 324 MILLIGRAM(S): at 13:18

## 2024-02-24 RX ADMIN — ATORVASTATIN CALCIUM 80 MILLIGRAM(S): 80 TABLET, FILM COATED ORAL at 13:18

## 2024-02-24 RX ADMIN — Medication 20 MILLIGRAM(S): at 22:16

## 2024-02-24 NOTE — H&P ADULT - NSHPPHYSICALEXAM_GEN_ALL_CORE
CONSTITUTIONAL: NAD    EYES: PERRLA and symmetric, EOMI, No conjunctival or scleral injection, non-icteric    ENMT: Oral mucosa with moist membranes. No external nasal lesions; normal dentition; no gross hearing impairment noted.    NECK: Supple, symmetric and without tracheal deviation; thyroid gland not enlarged and without palpable masses    RESPIRATORY: No respiratory distress, no use of accessory muscles; CTA b/l, no wheezes, rales or rhonchi, no dullness or hyperresonance to percussion, no tactile fremitus, no subcutaneous emphysema    CARDIOVASCULAR: RRRR, +S1S2, no murmur appreciated, no rubs, no gallops; no JVD; no peripheral edema    Vascular: no carotid bruits; carotid pulse palpable, radial pulse palpable, posterior tibialis pulse palpable    GASTROINTESTINAL: Soft, non tender, non distended, no rebound, no guarding; No palpable masses; no hepatosplenomegaly; no hernia palpated;    MUSCULOSKELETAL: no significant limitation on ROM, moves all extremities in plane of bed    SKIN: No rashes or ulcers noted; no subcutaneous nodules or induration palpable    NEUROLOGIC: moves all extremities against resistance, no droop    PSYCHIATRIC: Appropriate insight/judgment; A+O x 3, mood and affect appropriate, recent/remote memory intact

## 2024-02-24 NOTE — PATIENT PROFILE ADULT - HAVE YOU EXPERIENCED VIOLENCE OR A TRAUMATIC EVENT?
Physical Therapy Evaluation    Visit Count: 1  Plan of Care Dates: Initial: 4/18/2018 Through: 7/11/2018  Insurance Information: ANTHEM   $500 DED  80/20  $3000 OUT OF POCKET  104 COMB VISITS/VIKAS YR  NO AUTH/NO CO PAY  84331/ 71418- VALID AND BILLABLE  PER [facility] REF#[id number]  Next Referring Provider Visit: In 4 weeks    Referred by: Raulito Cash MD  Medical Diagnosis (from order): S06.0X0A Concussion without loss of consciousness, initial encounter  R51 Cervicogenic headache  M54.2 Neck pain   Treatment Diagnosis: Cervical Symptoms with Pain, Impaired Posture, Impaired Range of Motion, Impaired Motor Function/Muscle Performance, Radiating Pain and Headache  Insurance: 1. [employer] [place]  2. N/A    Date of onset/injury: 3-23-18   Diagnosis Precautions: Half work days since 4-16-18 x 1 week; progress to full days next week as tolerated  Chart reviewed: Relevant co-morbidities, allergies, tests and medications: 800 mg Ibuprofen 2 x per day   Patient Active Problem List   Diagnosis   • Essential hypertension   • Tachycardia, unspecified   • Hypersomnia with sleep apnea, unspecified   • Esophageal reflux   • Allergic rhinitis, cause unspecified   • Asthma, cough variant   • Dizziness and giddiness       CT 4-10-18 scan negative  IMPRESSION:   No acute intracranial process, bleed or mass.    SUBJECTIVE   Description of Problem/Mechanism of Injury: Per MD notes: Pt presents with complaints of a persistent headache after suffering a head injury 3-23-18. The patient states that she and her friends were at a casino in [facility]. She was squatting down to get something when she lost her balance and fell backwards. She apparently struck the back of a chair and then hit her head on the floor. She does not really remember after that, but she was told that her friends helped her up to a chair, and after that she basically went to bed.  She was not aware of any neurologic concerns that night,  Third Through Fifth Digit [] Left    [] Right   T1 Medial Side of Forearm to Base of Fifth Digit [] Left    [] Right   [x] All normal except those noted.   [] Only those assessed are noted.   [] Uninvolved within normal limits.    Muscle Flexibility:  Latissimus - Left: minimal restriction, Right: within normal limits  Levator Scapulae - Left: moderate tightness, Right: minimal tightness  Pectoralis Major - Left: moderate tightness, Right: within normal limits  Scalenes - Left: minimal tightness, Right: minimal tightness  Sub Occipitals - Left: minimal tightness, Right: within normal limits  Upper Trapezius - Left: moderate tightness, Right: minimal tightness    Palpation:  Tightness, increased tone L upper trapezius, levator scapula, cervical paraspinals, pectorals     Joint Play Assessment:  N/A this visit. Altered O-A mechanics due to tightness and forward head positioning    Special Tests:  Alar ligament: Negative bilaterally for instability of alar ligament  Compression in sitting: Negative left for cervical nerve root impingement  Neural tension test: To be further assessed, testing limited by L shoulder pain with GH abduction  for nerve irritation     Strength: Scapular: To be further assessed       Outcome Measures:   Neck Disability Index (NDI): Score: NDI Score Calculated: 50 % (scored 0-100, higher score indicates higher disability)    Headache Impact Test (HIT): Score: 73/78     Initial Treatment   Initial evaluation completed.    Therapeutic Exercise:   In place for HEP, performed in clinic:  *Chin tuck  - 5 second hold x 5 reps  *Cervical flexion stretch - 10 second hold x 2 reps  *Cervical lateral flexion stretch - 10 second hold x 2 reps each side  *Cervical rotation stretch - 10 second hold x 2 reps each side  *Posterior shoulder/mid back stretch - 10 second hold x 3 reps L  *Posterior shoulder circles x 5 reps, 2 sets    New:  *Sitting upper trapezius stretch - 10 second hold x 3 reps L - issued  [id number])  Neuromuscular Re-Education (95898)  Therapeutic Activity (29883)  Therapeutic Exercise (59721)  Electrical Stimulation  (61932//65139)   Ultrasound/Phonophoresis (89532)     The plan of care and goals were established with the patient who concurs.  Patient has been given attendance policy at time of initial evaluation.    Patient Education:  Who will be receiving education: patient  Are they ready to learn: yes  Preferred learning style: written, verbal, demonstration  Barriers to learning: no barriers apparent at this time   Result of initial outlined education: Verbalizes understanding, Demonstrates understanding and Needs reinforcement    THERAPY DAILY BILLING   Primary Insurance: [employer] [facility]  Secondary Insurance: N/A    Evaluation Procedures:  Physical Therapy Evaluation: Low Complexity    Timed Procedures:  Manual Therapy, 5 minutes  Therapeutic Activity, 10 minutes  Therapeutic Exercise, 15 minutes    Untimed Procedures:  No untimed codes were used on this date of service    Total Treatment Time: 60 minutes        no

## 2024-02-24 NOTE — H&P ADULT - HISTORY OF PRESENT ILLNESS
39yo woman hx of opioid misuse, stable on methadone for 9 years, presents w/ sudden onset painless vision loss.    Pt endorses she was in herusual state of health prior to 1 wk ago when she developed upper respiratory sxs (congestion, unproductive cough), she has been improving; this AM when she went to place her contacts she noted loss of peripheral vision from right eye, and endorses on applying her contacts that her eye globe felt "mushy"; presented to ED. Pt endorses that her vision has returned with no deficits.     Of note pt has hx of ophthalmological complaints, wears contacts and glasses, endorses extensive glaucoma w/u with in the past 3 months that was negative.     Pt denies fevers, CP, sob, numbness/tingling, n/v/d, endorses feeling mostly returned to her baseline state.     ED  /74 HR 99 T 97.7F 99% on RA  WBC 4.70  CTH negative    Pt was given aspirin 324, 1L LR, atorvastatin 80, ondansatron 4 IV;   Was a code stroke  Admitted to ICU for q4 neuro checks

## 2024-02-24 NOTE — H&P ADULT - NSHPLABSRESULTS_GEN_ALL_CORE
MEDICATIONS:  STANDING MEDICATIONS  methadone    Tablet 57 milliGRAM(s) Oral daily    PRN MEDICATIONS      LABS:                        13.5   4.72  )-----------( 172      ( 24 Feb 2024 10:22 )             38.4     02-24    138  |  100  |  8<L>  ----------------------------<  132<H>  4.4   |  28  |  0.9    Ca    9.4      24 Feb 2024 10:22    TPro  7.1  /  Alb  4.3  /  TBili  0.2  /  DBili  x   /  AST  14  /  ALT  12  /  AlkPhos  51  02-24    PT/INR - ( 24 Feb 2024 10:22 )   PT: 11.10 sec;   INR: 0.97 ratio         PTT - ( 24 Feb 2024 10:22 )  PTT:33.1 sec  Urinalysis Basic - ( 24 Feb 2024 10:22 )    Color: x / Appearance: x / SG: x / pH: x  Gluc: 132 mg/dL / Ketone: x  / Bili: x / Urobili: x   Blood: x / Protein: x / Nitrite: x   Leuk Esterase: x / RBC: x / WBC x   Sq Epi: x / Non Sq Epi: x / Bacteria: x    RADIOLOGY:    < from: CT Angio Neck Stroke Protocol w/ IV Cont (02.24.24 @ 10:28) >      IMPRESSION:    CT PERFUSION:  No perfusion deficit to suggest areas of completed infarction or at-risk   territory.    CTA HEAD/NECK:  No large vessel occlusion or hemodynamically significant stenosis per   NASCET criteria.    No saccular aneurysm or vascular malformation.    < end of copied text >        VITALS:   T(F): 97.6  HR: 96  BP: 131/80  RR: 16  SpO2: 99%

## 2024-02-24 NOTE — ED PROVIDER NOTE - OBJECTIVE STATEMENT
38-year-old female presents with vision loss in the lateral right eye.  Patient states symptoms began after waking up this morning and her eye felt "soft" when trying to put in her contact lens.  Patient states she has a history of myopia and has floaters at baseline.  Patient states symptoms have improved since arrival in the ED and vision is almost back to baseline.  Denies headache, fever, chest pain, shortness of breath, weakness.

## 2024-02-24 NOTE — ED ADULT NURSE NOTE - NSFALLUNIVINTERV_ED_ALL_ED
Bed/Stretcher in lowest position, wheels locked, appropriate side rails in place/Call bell, personal items and telephone in reach/Instruct patient to call for assistance before getting out of bed/chair/stretcher/Non-slip footwear applied when patient is off stretcher/[place] to call system/Physically safe environment - no spills, clutter or unnecessary equipment/Purposeful proactive rounding/Room/bathroom lighting operational, light cord in reach

## 2024-02-24 NOTE — ED PROVIDER NOTE - PROGRESS NOTE DETAILS
Spoke to Neuro, Tracy BOWERS,  advised admission for work up, Aspirin 324 mg, Lipitor and admission. Checks q 4 hours. MRI> Consider ophthalmology. Spoke to Dr. Kumar accepts to step down.

## 2024-02-24 NOTE — ED PROVIDER NOTE - CLINICAL SUMMARY MEDICAL DECISION MAKING FREE TEXT BOX
Pt presented with visual loss in the right eye. CT scan neg. symptoms resolved. Spoke to neuro advised admission to tele for neuro checks q 4 hours and MRI.

## 2024-02-24 NOTE — ED PROVIDER NOTE - ATTENDING APP SHARED VISIT CONTRIBUTION OF CARE
Pt reports she noted diminished vision in the right eye while placing contact. Pt noted loss of vision in the lateral field. Also noted the pupil was dilated as compared to the left. Since onset notes the vision has been improving. On exam s1S2 rrr, lungs clear, neuro intact. eyes appear normal. PT is able to see fingers. No pain. pt is on methadone. Hx of HTN.

## 2024-02-24 NOTE — H&P ADULT - ASSESSMENT
IMPRESSION:    - r/o CVA  - r/o ophthalmological etiology   - Hx opioid misuse    PLAN:    CNS  - avoid oversedation  - c/w home methadone   - neuro check q4  - f/u neuro recs  - asa/atorvastatin  - evaluate need for ophthalmological eval, would need transfer to [facility]  - oral care    PULM  - HOB 45 degrees    CARDIOVASCULAR  - lipid panel    GI  - tolerating feeds, having BMs    RENAL  - replete 'lytes K>4 Mg>2.1    ID  - observe off abx    HEME   - dvt ppx    ENDO  - a1c  - tsh    MSK  - no needs    DISPO: MICU

## 2024-02-24 NOTE — H&P ADULT - ATTENDING COMMENTS
Pt seen w resident and agree w above.  PMHx sig for opioid addiction on methadone for 9 years.  pt reports r visual field disturbance this am. painless loss of peripheral vision on r side. recent URI, that is now improving, states she felt warm during episode.  Ct head and perfusion studies are negative for acute infarct.  Recommend ophthalmology eval. Neuro eval/f/u.

## 2024-02-24 NOTE — ED PROVIDER NOTE - PHYSICAL EXAMINATION
Vital Signs: I have reviewed the initial vital signs.  CONSTITUTIONAL: Pt in no acute distress laying on stretcher.  SKIN: Skin exam is warm and dry, no acute rash.  HEAD: Normocephalic; atraumatic.  EYES: PERRL, EOM intact; conjunctiva and sclera clear. Visual acuity intact. Visual confrontation intact.  NEURO: Alert, oriented. Grossly unremarkable. No focal deficits.  PSYCH: Cooperative, appropriate.

## 2024-02-24 NOTE — PATIENT PROFILE ADULT - FALL HARM RISK - HARM RISK INTERVENTIONS

## 2024-02-25 LAB
ANION GAP SERPL CALC-SCNC: 8 MMOL/L — SIGNIFICANT CHANGE UP (ref 7–14)
BUN SERPL-MCNC: 10 MG/DL — SIGNIFICANT CHANGE UP (ref 10–20)
CALCIUM SERPL-MCNC: 9.4 MG/DL — SIGNIFICANT CHANGE UP (ref 8.4–10.5)
CHLORIDE SERPL-SCNC: 101 MMOL/L — SIGNIFICANT CHANGE UP (ref 98–110)
CHOLEST SERPL-MCNC: 131 MG/DL — SIGNIFICANT CHANGE UP
CO2 SERPL-SCNC: 31 MMOL/L — SIGNIFICANT CHANGE UP (ref 17–32)
CREAT SERPL-MCNC: 0.8 MG/DL — SIGNIFICANT CHANGE UP (ref 0.7–1.5)
EGFR: 98 ML/MIN/1.73M2 — SIGNIFICANT CHANGE UP
GLUCOSE SERPL-MCNC: 117 MG/DL — HIGH (ref 70–99)
HCT VFR BLD CALC: 37 % — SIGNIFICANT CHANGE UP (ref 37–47)
HDLC SERPL-MCNC: 53 MG/DL — SIGNIFICANT CHANGE UP
HGB BLD-MCNC: 12.8 G/DL — SIGNIFICANT CHANGE UP (ref 12–16)
LIPID PNL WITH DIRECT LDL SERPL: 59 MG/DL — SIGNIFICANT CHANGE UP
MCHC RBC-ENTMCNC: 29.6 PG — SIGNIFICANT CHANGE UP (ref 27–31)
MCHC RBC-ENTMCNC: 34.6 G/DL — SIGNIFICANT CHANGE UP (ref 32–37)
MCV RBC AUTO: 85.6 FL — SIGNIFICANT CHANGE UP (ref 81–99)
NON HDL CHOLESTEROL: 78 MG/DL — SIGNIFICANT CHANGE UP
NRBC # BLD: 0 /100 WBCS — SIGNIFICANT CHANGE UP (ref 0–0)
PLATELET # BLD AUTO: 173 K/UL — SIGNIFICANT CHANGE UP (ref 130–400)
PMV BLD: 9.1 FL — SIGNIFICANT CHANGE UP (ref 7.4–10.4)
POTASSIUM SERPL-MCNC: 4.6 MMOL/L — SIGNIFICANT CHANGE UP (ref 3.5–5)
POTASSIUM SERPL-SCNC: 4.6 MMOL/L — SIGNIFICANT CHANGE UP (ref 3.5–5)
RBC # BLD: 4.32 M/UL — SIGNIFICANT CHANGE UP (ref 4.2–5.4)
RBC # FLD: 11.9 % — SIGNIFICANT CHANGE UP (ref 11.5–14.5)
SODIUM SERPL-SCNC: 140 MMOL/L — SIGNIFICANT CHANGE UP (ref 135–146)
TRIGL SERPL-MCNC: 95 MG/DL — SIGNIFICANT CHANGE UP
WBC # BLD: 4.7 K/UL — LOW (ref 4.8–10.8)
WBC # FLD AUTO: 4.7 K/UL — LOW (ref 4.8–10.8)

## 2024-02-25 PROCEDURE — 99232 SBSQ HOSP IP/OBS MODERATE 35: CPT

## 2024-02-25 PROCEDURE — 93010 ELECTROCARDIOGRAM REPORT: CPT

## 2024-02-25 PROCEDURE — 99221 1ST HOSP IP/OBS SF/LOW 40: CPT

## 2024-02-25 RX ORDER — LANOLIN ALCOHOL/MO/W.PET/CERES
20 CREAM (GRAM) TOPICAL AT BEDTIME
Refills: 0 | Status: DISCONTINUED | OUTPATIENT
Start: 2024-02-25 | End: 2024-02-27

## 2024-02-25 RX ORDER — KETOROLAC TROMETHAMINE 30 MG/ML
15 SYRINGE (ML) INJECTION ONCE
Refills: 0 | Status: DISCONTINUED | OUTPATIENT
Start: 2024-02-25 | End: 2024-02-25

## 2024-02-25 RX ORDER — METHADONE HYDROCHLORIDE 40 MG/1
30 TABLET ORAL ONCE
Refills: 0 | Status: DISCONTINUED | OUTPATIENT
Start: 2024-02-25 | End: 2024-02-25

## 2024-02-25 RX ORDER — METHADONE HYDROCHLORIDE 40 MG/1
55 TABLET ORAL DAILY
Refills: 0 | Status: DISCONTINUED | OUTPATIENT
Start: 2024-02-26 | End: 2024-02-27

## 2024-02-25 RX ORDER — METHADONE HYDROCHLORIDE 40 MG/1
32 TABLET ORAL ONCE
Refills: 0 | Status: DISCONTINUED | OUTPATIENT
Start: 2024-02-25 | End: 2024-02-25

## 2024-02-25 RX ORDER — METOCLOPRAMIDE HCL 10 MG
10 TABLET ORAL ONCE
Refills: 0 | Status: DISCONTINUED | OUTPATIENT
Start: 2024-02-25 | End: 2024-02-26

## 2024-02-25 RX ADMIN — Medication 650 MILLIGRAM(S): at 12:51

## 2024-02-25 RX ADMIN — METHADONE HYDROCHLORIDE 30 MILLIGRAM(S): 40 TABLET ORAL at 18:10

## 2024-02-25 RX ADMIN — METHADONE HYDROCHLORIDE 25 MILLIGRAM(S): 40 TABLET ORAL at 11:13

## 2024-02-25 RX ADMIN — Medication 20 MILLIGRAM(S): at 21:22

## 2024-02-25 RX ADMIN — Medication 81 MILLIGRAM(S): at 11:14

## 2024-02-25 RX ADMIN — Medication 15 MILLIGRAM(S): at 16:10

## 2024-02-25 RX ADMIN — ATORVASTATIN CALCIUM 80 MILLIGRAM(S): 80 TABLET, FILM COATED ORAL at 21:22

## 2024-02-25 RX ADMIN — Medication 15 MILLIGRAM(S): at 15:40

## 2024-02-25 RX ADMIN — ENOXAPARIN SODIUM 40 MILLIGRAM(S): 100 INJECTION SUBCUTANEOUS at 05:18

## 2024-02-25 RX ADMIN — ONDANSETRON 4 MILLIGRAM(S): 8 TABLET, FILM COATED ORAL at 08:36

## 2024-02-25 RX ADMIN — Medication 650 MILLIGRAM(S): at 13:30

## 2024-02-25 RX ADMIN — Medication 30 MILLILITER(S): at 12:51

## 2024-02-25 NOTE — PROGRESS NOTE ADULT - ASSESSMENT
39yo woman with a medical hx of opioid misuse, stable on methadone for 9 years, presents with  transient R sided vision loss.    transient vision loss r/o TIA/CVA     - MRI brain   - tele   - aspirin   - neuro consult   - home meds   - DVT prophylaxis

## 2024-02-25 NOTE — PROGRESS NOTE ADULT - SUBJECTIVE AND OBJECTIVE BOX
Patient seen and evaluated this am, vision is back to normal, had ~0ne hour episode yesterday of loss of peripheral vision in R eye    T(F): 97.8 (02-25-24 @ 07:01), Max: 98.8 (02-24-24 @ 15:30)  HR: 62 (02-25-24 @ 07:01)  BP: 120/81 (02-25-24 @ 07:01)  RR: 16 (02-25-24 @ 07:01)  SpO2: 97% (02-25-24 @ 07:01) (96% - 100%)    PHYSICAL EXAM:  GENERAL: NAD  HEAD:  Atraumatic, Normocephalic  EYES: EOMI, PERRLA, conjunctiva and sclera clear  NERVOUS SYSTEM:  Alert & Oriented X3, no focal deficits   CHEST/LUNG: Clear to percussion bilaterally; No rales, rhonchi, wheezing, or rubs  HEART: Regular rate and rhythm; No murmurs, rubs, or gallops  ABDOMEN: Soft, Nontender, Nondistended; Bowel sounds present  EXTREMITIES:  2+ Peripheral Pulses, No clubbing, cyanosis, or edema    LABS  02-25    140  |  101  |  10  ----------------------------<  117<H>  4.6   |  31  |  0.8    Ca    9.4      25 Feb 2024 06:33    TPro  7.1  /  Alb  4.3  /  TBili  0.2  /  DBili  x   /  AST  14  /  ALT  12  /  AlkPhos  51  02-24                          12.8   4.70  )-----------( 173      ( 25 Feb 2024 06:33 )             37.0     PT/INR - ( 24 Feb 2024 10:22 )   PT: 11.10 sec;   INR: 0.97 ratio         PTT - ( 24 Feb 2024 10:22 )  PTT:33.1 sec    < from: 12 Lead ECG (02.25.24 @ 07:39) >    Diagnosis Line Normal sinus rhythm  Normal ECG      < end of copied text >      RADIOLOGY  < from: CT Angio Neck Stroke Protocol w/ IV Cont (02.24.24 @ 10:28) >  IMPRESSION:    CT PERFUSION:  No perfusion deficit to suggest areas of completed infarction or at-risk   territory.    CTA HEAD/NECK:  No large vessel occlusion or hemodynamically significant stenosis per   NASCET criteria.    No saccular aneurysm or vascular malformation.    < end of copied text >    MEDICATIONS  (STANDING):  aspirin  chewable 81 milliGRAM(s) Oral daily  atorvastatin 80 milliGRAM(s) Oral at bedtime  enoxaparin Injectable 40 milliGRAM(s) SubCutaneous every 24 hours  methadone    Tablet 25 milliGRAM(s) Oral daily  methadone    Tablet 30 milliGRAM(s) Oral at bedtime    MEDICATIONS  (PRN):  acetaminophen     Tablet .. 650 milliGRAM(s) Oral every 6 hours PRN Temp greater or equal to 38C (100.4F), Mild Pain (1 - 3)  aluminum hydroxide/magnesium hydroxide/simethicone Suspension 30 milliLiter(s) Oral every 4 hours PRN Dyspepsia  melatonin 3 milliGRAM(s) Oral at bedtime PRN Insomnia  ondansetron Injectable 4 milliGRAM(s) IV Push every 8 hours PRN Nausea and/or Vomiting

## 2024-02-25 NOTE — CONSULT NOTE ADULT - ASSESSMENT
Patient is a 37yo rt handed F with pmh opioid misuse, stable on methadone for 9 years, presents w/ sudden onset painless vision loss. Patient mentions rt eye vision went black and lasted for about 10 minutes.    Impression: RT eye painless vision 2/2 to possible TIA concern   Recommendaitons:   MR brain w/o   MR orbits   ASA 81mg po and Plavix 75mg PO Qday for 21 days followed by ASA 81mg   Lipitoy 80mg QHS titrates ldl less than 70   tsh, hgb a1c   optho eval   echo   telemetry

## 2024-02-25 NOTE — CONSULT NOTE ADULT - SUBJECTIVE AND OBJECTIVE BOX
MRN-[id number]  Patient is a 38y old  Female who presents with a chief complaint of   HPI:  Patient is a 37yo rt handed F with pmh opioid misuse, stable on methadone for 9 years, presents w/ sudden onset painless vision loss. Patient mentions rt eye vision went black and lasted for about 10 minutes. Patient states this has not happened before. Patient also mentioned her rt eye felt wierd as well. Patient was code stroke in the ED but was not a tNK candidate due to out of time window and resolution symptoms.         PAST MEDICAL & SURGICAL HISTORY:  No pertinent past medical history      No significant past surgical history        FAMILY HISTORY:    Social Hx:  Nonsmoker, no drug or alcohol use    Home Medications:  methadone 10 mg oral tablet: 57 milligram(s) orally once a day (24 Feb 2024 19:02)    MEDICATIONS  (STANDING):  aspirin  chewable 81 milliGRAM(s) Oral daily  atorvastatin 80 milliGRAM(s) Oral at bedtime  enoxaparin Injectable 40 milliGRAM(s) SubCutaneous every 24 hours    MEDICATIONS  (PRN):  acetaminophen     Tablet .. 650 milliGRAM(s) Oral every 6 hours PRN Temp greater or equal to 38C (100.4F), Mild Pain (1 - 3)  aluminum hydroxide/magnesium hydroxide/simethicone Suspension 30 milliLiter(s) Oral every 4 hours PRN Dyspepsia  LORazepam     Tablet 1 milliGRAM(s) Oral once PRN MRI  melatonin 3 milliGRAM(s) Oral at bedtime PRN Insomnia  ondansetron Injectable 4 milliGRAM(s) IV Push every 8 hours PRN Nausea and/or Vomiting    Allergies  No Known Allergies    Intolerances      REVIEW OF SYSTEMS  Ophthalmologic: no blurred vision   Cardiovascular:	deneis cp  Neurological:	denies ha     ROS: Pertinent positives in HPI, all other ROS were reviewed and are negative.      Vital Signs Last 24 Hrs  T(C): 36.7 (25 Feb 2024 15:18), Max: 37 (24 Feb 2024 20:00)  T(F): 98.1 (25 Feb 2024 15:18), Max: 98.6 (24 Feb 2024 20:00)  HR: 73 (25 Feb 2024 15:18) (62 - 78)  BP: 113/62 (25 Feb 2024 15:18) (113/62 - 130/50)  BP(mean): 79 (25 Feb 2024 15:18) (70 - 100)  RR: 18 (25 Feb 2024 15:18) (14 - 22)  SpO2: 97% (25 Feb 2024 15:18) (96% - 99%)    Parameters below as of 25 Feb 2024 15:18  Patient On (Oxygen Delivery Method): room air        GENERAL EXAM:  Constitutional: awake and alert. NAD  HEENT: PERRLA, EOMI  Neck: Supple  Respiratory: Breath sounds are clear bilaterally  Cardiovascular: S1 and S2, regular / irregular rhythm  Gastrointestinal: soft, nontender  Extremities: no edema, no cyanosis  Vascular: no carotid bruits  Musculoskeletal: no joint swelling/tenderness, no abnormal movements  Skin: no rashes    NEUROLOGICAL EXAM:  MS: AAOX3, fluent, attends b/l; recent and remote memory intact; normal attention, language and fund of knowledge.   CN: VFF, EOMI, PERRL, no CRYSTAL, no APD,  V1-3 intact, no facial asymmetry, t/p midline, SCM/trap intact.  Motor: Strength: 5/5 4x. Tone: normal. Bulk: normal. DTR 2+ symm.  Plantar flex b/l. Sensation: intact to LT/PP/Vibration/Position/Temperature 4x.   Coordination: intact 4x.   Gait:  Romberg negative, pull test negative; walks with narrow base, pivots in 2 steps.    NIHSS 0  mRS0     Labs:   cbc                      12.8   4.70  )-----------( 173      ( 25 Feb 2024 06:33 )             37.0     [id number]    140  |  101  |  10  ----------------------------<  117<H>  4.6   |  31  |  0.8    Ca    9.4      25 Feb 2024 06:33    TPro  7.1  /  Alb  4.3  /  TBili  0.2  /  DBili  x   /  AST  14  /  ALT  12  /  AlkPhos  51  02-24    CoagsPT/INR - ( 24 Feb 2024 10:22 )   PT: 11.10 sec;   INR: 0.97 ratio         PTT - ( 24 Feb 2024 10:22 )  PTT:33.1 sec  [id number] Chol 131 LDL -- HDL 53 Trig 95    LFTsLIVER FUNCTIONS - ( 24 Feb 2024 10:22 )  Alb: 4.3 g/dL / Pro: 7.1 g/dL / ALK PHOS: 51 U/L / ALT: 12 U/L / AST: 14 U/L / GGT: x           UAUrinalysis Basic - ( 25 Feb 2024 06:33 )    Color: x / Appearance: x / SG: x / pH: x  Gluc: 117 mg/dL / Ketone: x  / Bili: x / Urobili: x   Blood: x / Protein: x / Nitrite: x   Leuk Esterase: x / RBC: x / WBC x   Sq Epi: x / Non Sq Epi: x / Bacteria: x      Radiology:  CT head neg   CTA   IMPRESSION:    CT PERFUSION:  No perfusion deficit to suggest areas of completed infarction or at-risk   territory.    CTA HEAD/NECK:  No large vessel occlusion or hemodynamically significant stenosis per   NASCET criteria.    No saccular aneurysm or vascular malformation.    _____________  NASCET criteria for internal carotid artery stenosis:  Mild: 0% to 49%  Moderate: 50% to 69%  Severe: 70% to 99%  Complete Occlusion

## 2024-02-26 LAB
A1C WITH ESTIMATED AVERAGE GLUCOSE RESULT: 5.2 % — SIGNIFICANT CHANGE UP (ref 4–5.6)
ANION GAP SERPL CALC-SCNC: 7 MMOL/L — SIGNIFICANT CHANGE UP (ref 7–14)
BUN SERPL-MCNC: 8 MG/DL — LOW (ref 10–20)
CALCIUM SERPL-MCNC: 9.2 MG/DL — SIGNIFICANT CHANGE UP (ref 8.4–10.5)
CHLORIDE SERPL-SCNC: 99 MMOL/L — SIGNIFICANT CHANGE UP (ref 98–110)
CO2 SERPL-SCNC: 31 MMOL/L — SIGNIFICANT CHANGE UP (ref 17–32)
CREAT SERPL-MCNC: 0.8 MG/DL — SIGNIFICANT CHANGE UP (ref 0.7–1.5)
EGFR: 98 ML/MIN/1.73M2 — SIGNIFICANT CHANGE UP
ESTIMATED AVERAGE GLUCOSE: 103 MG/DL — SIGNIFICANT CHANGE UP (ref 68–114)
GLUCOSE SERPL-MCNC: 100 MG/DL — HIGH (ref 70–99)
HCG UR QL: NEGATIVE — SIGNIFICANT CHANGE UP
HCT VFR BLD CALC: 35.4 % — LOW (ref 37–47)
HGB BLD-MCNC: 12.3 G/DL — SIGNIFICANT CHANGE UP (ref 12–16)
MCHC RBC-ENTMCNC: 29.7 PG — SIGNIFICANT CHANGE UP (ref 27–31)
MCHC RBC-ENTMCNC: 34.7 G/DL — SIGNIFICANT CHANGE UP (ref 32–37)
MCV RBC AUTO: 85.5 FL — SIGNIFICANT CHANGE UP (ref 81–99)
NRBC # BLD: 0 /100 WBCS — SIGNIFICANT CHANGE UP (ref 0–0)
PLATELET # BLD AUTO: 186 K/UL — SIGNIFICANT CHANGE UP (ref 130–400)
PMV BLD: 9.3 FL — SIGNIFICANT CHANGE UP (ref 7.4–10.4)
POTASSIUM SERPL-MCNC: 4.5 MMOL/L — SIGNIFICANT CHANGE UP (ref 3.5–5)
POTASSIUM SERPL-SCNC: 4.5 MMOL/L — SIGNIFICANT CHANGE UP (ref 3.5–5)
RAPID RVP RESULT: SIGNIFICANT CHANGE UP
RBC # BLD: 4.14 M/UL — LOW (ref 4.2–5.4)
RBC # FLD: 11.9 % — SIGNIFICANT CHANGE UP (ref 11.5–14.5)
SARS-COV-2 RNA SPEC QL NAA+PROBE: SIGNIFICANT CHANGE UP
SODIUM SERPL-SCNC: 137 MMOL/L — SIGNIFICANT CHANGE UP (ref 135–146)
WBC # BLD: 4.69 K/UL — LOW (ref 4.8–10.8)
WBC # FLD AUTO: 4.69 K/UL — LOW (ref 4.8–10.8)

## 2024-02-26 PROCEDURE — 70551 MRI BRAIN STEM W/O DYE: CPT | Mod: 26

## 2024-02-26 PROCEDURE — 93010 ELECTROCARDIOGRAM REPORT: CPT

## 2024-02-26 PROCEDURE — 99232 SBSQ HOSP IP/OBS MODERATE 35: CPT

## 2024-02-26 PROCEDURE — 70540 MRI ORBIT/FACE/NECK W/O DYE: CPT | Mod: 26

## 2024-02-26 RX ORDER — METOCLOPRAMIDE HCL 10 MG
10 TABLET ORAL ONCE
Refills: 0 | Status: COMPLETED | OUTPATIENT
Start: 2024-02-26 | End: 2024-02-26

## 2024-02-26 RX ORDER — CHLORHEXIDINE GLUCONATE 213 G/1000ML
1 SOLUTION TOPICAL
Refills: 0 | Status: DISCONTINUED | OUTPATIENT
Start: 2024-02-26 | End: 2024-02-27

## 2024-02-26 RX ORDER — CLOPIDOGREL BISULFATE 75 MG/1
75 TABLET, FILM COATED ORAL DAILY
Refills: 0 | Status: DISCONTINUED | OUTPATIENT
Start: 2024-02-26 | End: 2024-02-26

## 2024-02-26 RX ADMIN — ATORVASTATIN CALCIUM 80 MILLIGRAM(S): 80 TABLET, FILM COATED ORAL at 20:25

## 2024-02-26 RX ADMIN — ONDANSETRON 4 MILLIGRAM(S): 8 TABLET, FILM COATED ORAL at 14:28

## 2024-02-26 RX ADMIN — Medication 81 MILLIGRAM(S): at 11:06

## 2024-02-26 RX ADMIN — Medication 650 MILLIGRAM(S): at 15:26

## 2024-02-26 RX ADMIN — Medication 10 MILLIGRAM(S): at 11:06

## 2024-02-26 RX ADMIN — Medication 650 MILLIGRAM(S): at 14:28

## 2024-02-26 RX ADMIN — METHADONE HYDROCHLORIDE 55 MILLIGRAM(S): 40 TABLET ORAL at 11:06

## 2024-02-26 RX ADMIN — CLOPIDOGREL BISULFATE 75 MILLIGRAM(S): 75 TABLET, FILM COATED ORAL at 11:06

## 2024-02-26 RX ADMIN — Medication 20 MILLIGRAM(S): at 20:26

## 2024-02-26 RX ADMIN — Medication 0.5 MILLIGRAM(S): at 12:12

## 2024-02-26 NOTE — PROGRESS NOTE ADULT - SUBJECTIVE AND OBJECTIVE BOX
YOANNA SHAW 38y Female  MRN#: [id number]   CODE STATUS:________    Hospital Day: 2d    Pt is currently admitted with the primary diagnosis of vision loss     SUBJECTIVE  HPI   39yo woman with history of severe myopia presents w/ sudden onset painless vision loss.    Patient reports feeling sick for the last week with some congestion and unproductive cough which had been improving. She has also had some shakiness and uneasiness. Yesterday she. woke up feeling hot and weak. She noticed that the right side of her eye sight felt that there was a “curtain” over it. When she went to place her contacts she noticed her eyes globe felt mushy on the right side. She presented to the ED but by then her visual field defect had resolved.     Pt denies fevers, CP, sob, numbness/tingling, n/v/d. In the ED, vitals were stable, WBC 4.0 and CTH and CT perfusion were negative. Pt was given aspirin 324, 1L LR, atorvastatin 80, ondansatron 4 IV.   Admitted to ICU for q4 neuro checks    This morning reports feeling weak and HA since she came in. Of note, she has been tapering her methadone treatments in the last week.      Present Today:   - [place]:  No [x  ], Yes [   ] : Indication:     - Type of IV Access:       .. CVC/Piccline:  No [x  ], Yes [   ] : Indication:       .. Midline: No [x  ], Yes [   ] : Indication:                                             ----------------------------------------------------------  OBJECTIVE  PAST MEDICAL & SURGICAL HISTORY  No pertinent past medical history    No significant past surgical history                                              -----------------------------------------------------------  ALLERGIES:  No Known Allergies                                            ------------------------------------------------------------    HOME MEDICATIONS  Home Medications:  methadone 10 mg oral tablet: 57 milligram(s) orally once a day (24 Feb 2024 19:02)                           MEDICATIONS:  STANDING MEDICATIONS  aspirin  chewable 81 milliGRAM(s) Oral daily  atorvastatin 80 milliGRAM(s) Oral at bedtime  chlorhexidine 2% Cloths 1 Application(s) Topical <User Schedule>  clopidogrel Tablet 75 milliGRAM(s) Oral daily  enoxaparin Injectable 40 milliGRAM(s) SubCutaneous every 24 hours  melatonin 20 milliGRAM(s) Oral at bedtime  methadone    Tablet 55 milliGRAM(s) Oral daily  metoclopramide  IVPB 10 milliGRAM(s) IV Intermittent once    PRN MEDICATIONS  acetaminophen     Tablet .. 650 milliGRAM(s) Oral every 6 hours PRN  aluminum hydroxide/magnesium hydroxide/simethicone Suspension 30 milliLiter(s) Oral every 4 hours PRN  LORazepam     Tablet 1 milliGRAM(s) Oral once PRN  ondansetron Injectable 4 milliGRAM(s) IV Push every 8 hours PRN                                            ------------------------------------------------------------  VITAL SIGNS: Last 24 Hours  T(C): 36.4 (26 Feb 2024 07:05), Max: 37.2 (25 Feb 2024 23:27)  T(F): 97.6 (26 Feb 2024 07:05), Max: 98.9 (25 Feb 2024 23:27)  HR: 58 (26 Feb 2024 07:31) (58 - 84)  BP: 101/58 (26 Feb 2024 07:07) (101/58 - 128/82)  BP(mean): 75 (26 Feb 2024 07:07) (75 - 100)  RR: 22 (26 Feb 2024 07:07) (17 - 29)  SpO2: 98% (25 Feb 2024 23:27) (96% - 98%)      02-25-24 @ 07:01  -  02-26-24 @ 07:00  --------------------------------------------------------  IN: 420 mL / OUT: 0 mL / NET: 420 mL    02-26-24 @ 07:01  -  02-26-24 @ 09:16  --------------------------------------------------------  IN: 120 mL / OUT: 0 mL / NET: 120 mL                                             --------------------------------------------------------------  LABS:                        12.3   4.69  )-----------( 186      ( 26 Feb 2024 05:25 )             35.4     02-26    137  |  99  |  8<L>  ----------------------------<  100<H>  4.5   |  31  |  0.8    Ca    9.2      26 Feb 2024 05:25    TPro  7.1  /  Alb  4.3  /  TBili  0.2  /  DBili  x   /  AST  14  /  ALT  12  /  AlkPhos  51  02-24    PT/INR - ( 24 Feb 2024 10:22 )   PT: 11.10 sec;   INR: 0.97 ratio         PTT - ( 24 Feb 2024 10:22 )  PTT:33.1 sec  Urinalysis Basic - ( 26 Feb 2024 05:25 )    Color: x / Appearance: x / SG: x / pH: x  Gluc: 100 mg/dL / Ketone: x  / Bili: x / Urobili: x   Blood: x / Protein: x / Nitrite: x   Leuk Esterase: x / RBC: x / WBC x   Sq Epi: x / Non Sq Epi: x / Bacteria: x                                                            -------------------------------------------------------------  RADIOLOGY:  CT Angio Brain and Neck, CT Brain Perfusion   2/24/2024    FINDINGS:    PERFUSION:    CBF<30%: 0  Tmax>6sec: 0 mL  Mismatch volume: 0 mL  Mismatch ratio: None    IMPRESSION:  No perfusion deficit to suggest areas of completed infarction or at-risk   territory.    CTAHEAD/NECK:    AORTIC ARCH: Normal 3 vessel arch.    RIGHT ANTERIOR CIRCULATION:  The common carotid artery (CCA) is patent up to the bulb without   stenosis. The carotid bulb is patent. The external carotid artery (ECA)   and its proximal branches are patent without stenosis. The cervical   internal carotid artery (ICA) is patent without stenosis. The   intracranial internal carotid artery is patent without stenosis.    The anterior and middle cerebral arteries are patent.      LEFT ANTERIOR CIRCULATION:  The CCA is patent up to the bulb without stenosis. The carotid bulb is   patent. The ECA and its proximal branches are patent without stenosis.   The cervical ICA is patent without stenosis. The intracranial ICA is   patent without stenosis.    The anterior and middle cerebral arteries are patent.      POSTERIOR CIRCULATION:  The vertebral arteries are patent without stenosis bilaterally. The   basilar artery is patent without stenosis. The proximal branch   vasculature of the posterior circulation are within normal limits.    The posterior cerebral arteries are patent without stenosis.    There is no evidence for saccular aneurysm, vascular malformation, or   large vessel occlusion.    Dural venous sinuses are patent.    OTHERS:  Visualized lung apices are unremarkable.        Impression    No large vessel occlusion or hemodynamically significant stenosis per   NASCET criteria.    No saccular aneurysm or vascular malformation.                                                --------------------------------------------------------------    PHYSICAL EXAM:  PHYSICAL EXAM:  GENERAL: NAD, lying in bed comfortably  HEAD:  Atraumatic, Normocephalic  EYES: EOMI, PERRLA, conjunctiva and sclera clear  ENT: Moist mucous membranes  NECK: Supple, No JVD  CHEST/LUNG: Clear to auscultation bilaterally; No rales, rhonchi, wheezing, or rubs. Unlabored respirations  HEART: Regular rate and rhythm; No murmurs, rubs, or gallops  ABDOMEN: Bowel sounds present; Soft, Nontender, Nondistended. No hepatomegally  EXTREMITIES:  2+ Peripheral Pulses, brisk capillary refill. No clubbing, cyanosis, or edema  NERVOUS SYSTEM:  Alert & Oriented X3, No visual field defects. speech clear. No deficits   MSK: FROM all 4 extremities, full and equal strength  SKIN: No rashes or lesions                                         --------------------------------------------------------------       YOANNA SHAW 38y Female  MRN#: [id number]   CODE STATUS:________    Hospital Day: 2d    Pt is currently admitted with the primary diagnosis of vision loss     SUBJECTIVE  HPI   37yo woman with history of severe myopia presents w/ sudden onset painless vision loss.    Patient reports feeling sick for the last week with some congestion and unproductive cough which had been improving. She has also had some shakiness and uneasiness. Yesterday she. woke up feeling hot and weak. She noticed that the right side of her eye sight felt that there was a “curtain” over it. When she went to place her contacts she noticed her eyes globe felt mushy on the right side. She presented to the ED but by then her visual field defect had resolved.     Pt denies fevers, CP, sob, numbness/tingling, n/v/d. In the ED, vitals were stable, WBC 4.0 and CTH and CT perfusion were negative. Pt was given aspirin 324, 1L LR, atorvastatin 80, ondansatron 4 IV.   Admitted to ICU for q4 neuro checks    This morning reports feeling weak and HA since she came in. Of note, she has been tapering her methadone treatments in the last week.  She denies having any more vision change episodes. She just feels fluctuation between hot and cold.      Present Today:   - [place]o:  No [x  ], Yes [   ] : Indication:     - Type of IV Access:       .. CVC/Piccline:  No [x  ], Yes [   ] : Indication:       .. Midline: No [x  ], Yes [   ] : Indication:                                             ----------------------------------------------------------  OBJECTIVE  PAST MEDICAL & SURGICAL HISTORY  No pertinent past medical history    No significant past surgical history                                              -----------------------------------------------------------  ALLERGIES:  No Known Allergies                                            ------------------------------------------------------------    HOME MEDICATIONS  Home Medications:  methadone 10 mg oral tablet: 57 milligram(s) orally once a day (24 Feb 2024 19:02)                           MEDICATIONS:  STANDING MEDICATIONS  aspirin  chewable 81 milliGRAM(s) Oral daily  atorvastatin 80 milliGRAM(s) Oral at bedtime  chlorhexidine 2% Cloths 1 Application(s) Topical <User Schedule>  clopidogrel Tablet 75 milliGRAM(s) Oral daily  enoxaparin Injectable 40 milliGRAM(s) SubCutaneous every 24 hours  melatonin 20 milliGRAM(s) Oral at bedtime  methadone    Tablet 55 milliGRAM(s) Oral daily  metoclopramide  IVPB 10 milliGRAM(s) IV Intermittent once    PRN MEDICATIONS  acetaminophen     Tablet .. 650 milliGRAM(s) Oral every 6 hours PRN  aluminum hydroxide/magnesium hydroxide/simethicone Suspension 30 milliLiter(s) Oral every 4 hours PRN  LORazepam     Tablet 1 milliGRAM(s) Oral once PRN  ondansetron Injectable 4 milliGRAM(s) IV Push every 8 hours PRN                                            ------------------------------------------------------------  VITAL SIGNS: Last 24 Hours  T(C): 36.4 (26 Feb 2024 07:05), Max: 37.2 (25 Feb 2024 23:27)  T(F): 97.6 (26 Feb 2024 07:05), Max: 98.9 (25 Feb 2024 23:27)  HR: 58 (26 Feb 2024 07:31) (58 - 84)  BP: 101/58 (26 Feb 2024 07:07) (101/58 - 128/82)  BP(mean): 75 (26 Feb 2024 07:07) (75 - 100)  RR: 22 (26 Feb 2024 07:07) (17 - 29)  SpO2: 98% (25 Feb 2024 23:27) (96% - 98%)      02-25-24 @ 07:01  -  02-26-24 @ 07:00  --------------------------------------------------------  IN: 420 mL / OUT: 0 mL / NET: 420 mL    02-26-24 @ 07:01  -  02-26-24 @ 09:16  --------------------------------------------------------  IN: 120 mL / OUT: 0 mL / NET: 120 mL                                             --------------------------------------------------------------  LABS:                        12.3   4.69  )-----------( 186      ( 26 Feb 2024 05:25 )             35.4     02-26    137  |  99  |  8<L>  ----------------------------<  100<H>  4.5   |  31  |  0.8    Ca    9.2      26 Feb 2024 05:25    TPro  7.1  /  Alb  4.3  /  TBili  0.2  /  DBili  x   /  AST  14  /  ALT  12  /  AlkPhos  51  02-24    PT/INR - ( 24 Feb 2024 10:22 )   PT: 11.10 sec;   INR: 0.97 ratio         PTT - ( 24 Feb 2024 10:22 )  PTT:33.1 sec  Urinalysis Basic - ( 26 Feb 2024 05:25 )    Color: x / Appearance: x / SG: x / pH: x  Gluc: 100 mg/dL / Ketone: x  / Bili: x / Urobili: x   Blood: x / Protein: x / Nitrite: x   Leuk Esterase: x / RBC: x / WBC x   Sq Epi: x / Non Sq Epi: x / Bacteria: x                                                            -------------------------------------------------------------  RADIOLOGY:  CT Angio Brain and Neck, CT Brain Perfusion   2/24/2024    FINDINGS:    PERFUSION:    CBF<30%: 0  Tmax>6sec: 0 mL  Mismatch volume: 0 mL  Mismatch ratio: None    IMPRESSION:  No perfusion deficit to suggest areas of completed infarction or at-risk   territory.    CTAHEAD/NECK:    AORTIC ARCH: Normal 3 vessel arch.    RIGHT ANTERIOR CIRCULATION:  The common carotid artery (CCA) is patent up to the bulb without   stenosis. The carotid bulb is patent. The external carotid artery (ECA)   and its proximal branches are patent without stenosis. The cervical   internal carotid artery (ICA) is patent without stenosis. The   intracranial internal carotid artery is patent without stenosis.    The anterior and middle cerebral arteries are patent.      LEFT ANTERIOR CIRCULATION:  The CCA is patent up to the bulb without stenosis. The carotid bulb is   patent. The ECA and its proximal branches are patent without stenosis.   The cervical ICA is patent without stenosis. The intracranial ICA is   patent without stenosis.    The anterior and middle cerebral arteries are patent.      POSTERIOR CIRCULATION:  The vertebral arteries are patent without stenosis bilaterally. The   basilar artery is patent without stenosis. The proximal branch   vasculature of the posterior circulation are within normal limits.    The posterior cerebral arteries are patent without stenosis.    There is no evidence for saccular aneurysm, vascular malformation, or   large vessel occlusion.    Dural venous sinuses are patent.    OTHERS:  Visualized lung apices are unremarkable.        Impression    No large vessel occlusion or hemodynamically significant stenosis per   NASCET criteria.    No saccular aneurysm or vascular malformation.                                                --------------------------------------------------------------    PHYSICAL EXAM:  PHYSICAL EXAM:  GENERAL: NAD, lying in bed comfortably  HEAD:  Atraumatic, Normocephalic  EYES: EOMI, PERRLA, conjunctiva and sclera clear  ENT: Moist mucous membranes  NECK: Supple, No JVD  CHEST/LUNG: Clear to auscultation bilaterally; No rales, rhonchi, wheezing, or rubs. Unlabored respirations  HEART: Regular rate and rhythm; No murmurs, rubs, or gallops  ABDOMEN: Bowel sounds present; Soft, Nontender, Nondistended. No hepatomegaly  EXTREMITIES:  2+ Peripheral Pulses, no edema  NERVOUS SYSTEM:  Alert & Oriented X3, No visual field defects. speech clear. No deficits   MSK: FROM all 4 extremities, full and equal strength  SKIN: No rashes or lesions                                         --------------------------------------------------------------       Patient/Caregiver provided printed discharge information.

## 2024-02-26 NOTE — PROGRESS NOTE ADULT - ASSESSMENT
38 year old female with hx of opoid dependance presenting with right sided painless vision loss possibly due to TIA vs. primary ophthalmic disease     #Visual field defect, now resolved   #Hx of Opiod dependance     #Neuro   - CT-Perfusion/CT-H WNL   - MRI Brain and Orbits pending   - ASA   - Plavix 75 mg   - Atorvastatin   - Q4 Neuro checks   - C/w Methadone treatment for hx of opioid misuse   - FU Neuro recommendations   - Ophthalmology consult     #Cardio   - Echo with bubble study   - Cholesterol WNL     #Resp   - Stable   - HOB 45 degrees     #ID   - Stable     #GI   - GI PPx     #   - Stable     #Endocrine   - TSH   - HbA1C      #Heme   - PPx Lovanox     #HEENT   - Stable     Dispo: Per neuro   Code Status: Full Code       38 year old female with hx of opoid dependance presenting with right sided painless vision loss possibly due to TIA vs. primary ophthalmic disease.     #Visual field defect, now resolved   #possible TIA, r/o CVA  - s/p code stroke in the ED  - CT-Perfusion/CT-H WNL   - seen by neurology  - MRI Brain and Orbits pending   - continue with aspirin, started plavix as per neuro recs  - continue with lipitor 80mg- lipid panel noted  -   - Q4 Neuro checks   - C/w Methadone treatment for hx of opioid misuse   - FU Neuro recommendations   - Ophthalmology consult         #Hx of Opiod dependance       38 year old female with hx of opoid dependance presenting with right sided painless vision loss possibly due to TIA vs. primary ophthalmic disease.     #Visual field defect, now resolved   #possible TIA, r/o CVA  - s/p code stroke in the ED  - CT-Perfusion/CT-H WNL   - seen by neurology  - MRI Brain and Orbits pending   - continue with aspirin, started plavix as per neuro recs  - continue with lipitor 80mg- lipid panel noted  - TSH, a1c pending  - continue with Q4 Neuro checks   - follow up echo with bubble study  - ophtho following- discussed with them over the phone, will follow up with pt once MRI are done    #Hx of Opiod dependance   - C/w Methadone treatment for hx of opioid misuse - takes 57mg daily at home  - has been tapering off with outpatient methadone clinic    #Misc:  - DVT ppx: lovenox  - Diet: regular  - Activity: IAT   - Dispo: SDU

## 2024-02-26 NOTE — OCCUPATIONAL THERAPY INITIAL EVALUATION ADULT - PERTINENT HX OF CURRENT PROBLEM, REHAB EVAL
37yo woman hx of opioid misuse, stable on methadone for 9 years, presents w/ sudden onset painless vision loss. Pt endorses she was in her usual state of health prior to 1 wk ago when she developed upper respiratory sxs (congestion, unproductive cough), she has been improving; this AM when she went to place her contacts she noted loss of peripheral vision from right eye, and endorses on applying her contacts that her eye globe felt "mushy"; presented to ED. Pt endorses that her vision has returned with no deficits. Of note pt has hx of ophthalmological complaints, wears contacts and glasses, endorses extensive glaucoma w/u with in the past 3 months that was negative. Pt denies fevers, CP, sob, numbness/tingling, n/v/d, endorses feeling mostly returned to her baseline state.   CT Brain: No evidence of acute intracranial pathology.  CT Brain Perfusion: No perfusion deficit to suggest areas of completed infarction or at-risk   territory.  CTA HEAD/NECK:No large vessel occlusion or hemodynamically significant stenosis per NASCET criteria. No saccular aneurysm or vascular malformation.

## 2024-02-26 NOTE — OCCUPATIONAL THERAPY INITIAL EVALUATION ADULT - LIVES WITH, PROFILE
fiance in a semi-attached 2 family home; 1 step to enter; 7 steps (L) HR down to kitchen; (+) Bathtub/other relative

## 2024-02-26 NOTE — PROGRESS NOTE ADULT - SUBJECTIVE AND OBJECTIVE BOX
Patient is a 38y old  Female who presents with a chief complaint of R/o stroke/TIA (26 Feb 2024 09:16)      T(F): 97.6 (02-26-24 @ 07:05), Max: 98.9 (02-25-24 @ 23:27)  HR: 58 (02-26-24 @ 07:31)  BP: 101/58 (02-26-24 @ 07:07)  RR: 22 (02-26-24 @ 07:07)  SpO2: 98% (02-25-24 @ 23:27) (96% - 98%)    PHYSICAL EXAM:  GENERAL: NAD  HEAD:  Atraumatic, Normocephalic  EYES: EOMI, PERRLA, conjunctiva and sclera clear  NERVOUS SYSTEM:  Alert & Oriented X3, no focal deficits   CHEST/LUNG: Clear to percussion bilaterally; No rales, rhonchi, wheezing, or rubs  HEART: Regular rate and rhythm; No murmurs, rubs, or gallops  ABDOMEN: Soft, Nontender, Nondistended; Bowel sounds present  EXTREMITIES:  2+ Peripheral Pulses, No clubbing, cyanosis, or edema    LABS  02-26    137  |  99  |  8<L>  ----------------------------<  100<H>  4.5   |  31  |  0.8    Ca    9.2      26 Feb 2024 05:25    TPro  7.1  /  Alb  4.3  /  TBili  0.2  /  DBili  x   /  AST  14  /  ALT  12  /  AlkPhos  51  02-24                          12.3   4.69  )-----------( 186      ( 26 Feb 2024 05:25 )             35.4     PT/INR - ( 24 Feb 2024 10:22 )   PT: 11.10 sec;   INR: 0.97 ratio         PTT - ( 24 Feb 2024 10:22 )  PTT:33.1 sec    < from: 12 Lead ECG (02.26.24 @ 08:11) >  Diagnosis Line Normal sinus rhythm  Normal ECG      < end of copied text >      RADIOLOGY  < from: CT Angio Neck Stroke Protocol w/ IV Cont (02.24.24 @ 10:28) >  IMPRESSION:    CT PERFUSION:  No perfusion deficit to suggest areas of completed infarction or at-risk   territory.    CTA HEAD/NECK:  No large vessel occlusion or hemodynamically significant stenosis per   NASCET criteria.    No saccular aneurysm or vascular malformation.    < end of copied text >    MEDICATIONS  (STANDING):  aspirin  chewable 81 milliGRAM(s) Oral daily  atorvastatin 80 milliGRAM(s) Oral at bedtime  chlorhexidine 2% Cloths 1 Application(s) Topical <User Schedule>  clopidogrel Tablet 75 milliGRAM(s) Oral daily  enoxaparin Injectable 40 milliGRAM(s) SubCutaneous every 24 hours  melatonin 20 milliGRAM(s) Oral at bedtime  methadone    Tablet 55 milliGRAM(s) Oral daily  metoclopramide  IVPB 10 milliGRAM(s) IV Intermittent once    MEDICATIONS  (PRN):  acetaminophen     Tablet .. 650 milliGRAM(s) Oral every 6 hours PRN Temp greater or equal to 38C (100.4F), Mild Pain (1 - 3)  aluminum hydroxide/magnesium hydroxide/simethicone Suspension 30 milliLiter(s) Oral every 4 hours PRN Dyspepsia  LORazepam     Tablet 1 milliGRAM(s) Oral once PRN MRI  ondansetron Injectable 4 milliGRAM(s) IV Push every 8 hours PRN Nausea and/or Vomiting

## 2024-02-26 NOTE — PROGRESS NOTE ADULT - ASSESSMENT
37yo woman with a medical hx of opioid misuse, stable on methadone for 9 years, presents with  transient R sided vision loss.    transient vision loss r/o TIA/CVA     - MRI brain   - tele   - aspirin   - neuro consult   - home meds   - DVT prophylaxis

## 2024-02-26 NOTE — PHYSICAL THERAPY INITIAL EVALUATION ADULT - SPECIFY REASON(S)
Attempted to see  Pt this  PM for Bedside PT , Pt not available , pt at MRI , RN  Notified , Will F/u as appropriated.

## 2024-02-26 NOTE — OCCUPATIONAL THERAPY INITIAL EVALUATION ADULT - GENERAL OBSERVATIONS, REHAB EVAL
10:42-11:00 Chart reviewed, ok to treat by Occupational Therapist as confirmed by RN Olga, Pt received semi-Blakely's in bed (+) tele in NAD. Pt in agreement with OT IE.

## 2024-02-27 ENCOUNTER — TRANSCRIPTION ENCOUNTER (OUTPATIENT)
Age: 39
End: 2024-02-27

## 2024-02-27 VITALS — SYSTOLIC BLOOD PRESSURE: 114 MMHG | DIASTOLIC BLOOD PRESSURE: 73 MMHG | HEART RATE: 96 BPM

## 2024-02-27 LAB
ALBUMIN SERPL ELPH-MCNC: 4.3 G/DL — SIGNIFICANT CHANGE UP (ref 3.5–5.2)
ALP SERPL-CCNC: 47 U/L — SIGNIFICANT CHANGE UP (ref 30–115)
ALT FLD-CCNC: 11 U/L — SIGNIFICANT CHANGE UP (ref 0–41)
ANION GAP SERPL CALC-SCNC: 10 MMOL/L — SIGNIFICANT CHANGE UP (ref 7–14)
AST SERPL-CCNC: 12 U/L — SIGNIFICANT CHANGE UP (ref 0–41)
BASOPHILS # BLD AUTO: 0.04 K/UL — SIGNIFICANT CHANGE UP (ref 0–0.2)
BASOPHILS NFR BLD AUTO: 0.7 % — SIGNIFICANT CHANGE UP (ref 0–1)
BILIRUB SERPL-MCNC: 0.5 MG/DL — SIGNIFICANT CHANGE UP (ref 0.2–1.2)
BUN SERPL-MCNC: 13 MG/DL — SIGNIFICANT CHANGE UP (ref 10–20)
CALCIUM SERPL-MCNC: 10 MG/DL — SIGNIFICANT CHANGE UP (ref 8.4–10.5)
CHLORIDE SERPL-SCNC: 101 MMOL/L — SIGNIFICANT CHANGE UP (ref 98–110)
CO2 SERPL-SCNC: 30 MMOL/L — SIGNIFICANT CHANGE UP (ref 17–32)
CREAT SERPL-MCNC: 0.9 MG/DL — SIGNIFICANT CHANGE UP (ref 0.7–1.5)
EGFR: 85 ML/MIN/1.73M2 — SIGNIFICANT CHANGE UP
EOSINOPHIL # BLD AUTO: 0.1 K/UL — SIGNIFICANT CHANGE UP (ref 0–0.7)
EOSINOPHIL NFR BLD AUTO: 1.8 % — SIGNIFICANT CHANGE UP (ref 0–8)
GLUCOSE BLDC GLUCOMTR-MCNC: 108 MG/DL — HIGH (ref 70–99)
GLUCOSE SERPL-MCNC: 103 MG/DL — HIGH (ref 70–99)
HCT VFR BLD CALC: 35.1 % — LOW (ref 37–47)
HGB BLD-MCNC: 12.5 G/DL — SIGNIFICANT CHANGE UP (ref 12–16)
IMM GRANULOCYTES NFR BLD AUTO: 0 % — LOW (ref 0.1–0.3)
LYMPHOCYTES # BLD AUTO: 1.64 K/UL — SIGNIFICANT CHANGE UP (ref 1.2–3.4)
LYMPHOCYTES # BLD AUTO: 28.9 % — SIGNIFICANT CHANGE UP (ref 20.5–51.1)
MAGNESIUM SERPL-MCNC: 2.1 MG/DL — SIGNIFICANT CHANGE UP (ref 1.8–2.4)
MCHC RBC-ENTMCNC: 29.7 PG — SIGNIFICANT CHANGE UP (ref 27–31)
MCHC RBC-ENTMCNC: 35.6 G/DL — SIGNIFICANT CHANGE UP (ref 32–37)
MCV RBC AUTO: 83.4 FL — SIGNIFICANT CHANGE UP (ref 81–99)
MONOCYTES # BLD AUTO: 0.39 K/UL — SIGNIFICANT CHANGE UP (ref 0.1–0.6)
MONOCYTES NFR BLD AUTO: 6.9 % — SIGNIFICANT CHANGE UP (ref 1.7–9.3)
NEUTROPHILS # BLD AUTO: 3.51 K/UL — SIGNIFICANT CHANGE UP (ref 1.4–6.5)
NEUTROPHILS NFR BLD AUTO: 61.7 % — SIGNIFICANT CHANGE UP (ref 42.2–75.2)
NRBC # BLD: 0 /100 WBCS — SIGNIFICANT CHANGE UP (ref 0–0)
PLATELET # BLD AUTO: 193 K/UL — SIGNIFICANT CHANGE UP (ref 130–400)
PMV BLD: 9.1 FL — SIGNIFICANT CHANGE UP (ref 7.4–10.4)
POTASSIUM SERPL-MCNC: 5.9 MMOL/L — HIGH (ref 3.5–5)
POTASSIUM SERPL-SCNC: 5.9 MMOL/L — HIGH (ref 3.5–5)
PROT SERPL-MCNC: 6.5 G/DL — SIGNIFICANT CHANGE UP (ref 6–8)
RBC # BLD: 4.21 M/UL — SIGNIFICANT CHANGE UP (ref 4.2–5.4)
RBC # FLD: 11.8 % — SIGNIFICANT CHANGE UP (ref 11.5–14.5)
SODIUM SERPL-SCNC: 141 MMOL/L — SIGNIFICANT CHANGE UP (ref 135–146)
TSH SERPL-MCNC: 3.68 UIU/ML — SIGNIFICANT CHANGE UP (ref 0.27–4.2)
WBC # BLD: 5.68 K/UL — SIGNIFICANT CHANGE UP (ref 4.8–10.8)
WBC # FLD AUTO: 5.68 K/UL — SIGNIFICANT CHANGE UP (ref 4.8–10.8)

## 2024-02-27 PROCEDURE — 99253 IP/OBS CNSLTJ NEW/EST LOW 45: CPT

## 2024-02-27 PROCEDURE — 99239 HOSP IP/OBS DSCHRG MGMT >30: CPT

## 2024-02-27 RX ORDER — ATORVASTATIN CALCIUM 80 MG/1
40 TABLET, FILM COATED ORAL AT BEDTIME
Refills: 0 | Status: DISCONTINUED | OUTPATIENT
Start: 2024-02-27 | End: 2024-02-27

## 2024-02-27 RX ORDER — INSULIN HUMAN 100 [IU]/ML
10 INJECTION, SOLUTION SUBCUTANEOUS ONCE
Refills: 0 | Status: COMPLETED | OUTPATIENT
Start: 2024-02-27 | End: 2024-02-27

## 2024-02-27 RX ORDER — CALCIUM GLUCONATE 100 MG/ML
2 VIAL (ML) INTRAVENOUS ONCE
Refills: 0 | Status: COMPLETED | OUTPATIENT
Start: 2024-02-27 | End: 2024-02-27

## 2024-02-27 RX ORDER — SODIUM CHLORIDE 9 MG/ML
1000 INJECTION, SOLUTION INTRAVENOUS
Refills: 0 | Status: DISCONTINUED | OUTPATIENT
Start: 2024-02-27 | End: 2024-02-27

## 2024-02-27 RX ORDER — ATORVASTATIN CALCIUM 80 MG/1
1 TABLET, FILM COATED ORAL
Qty: 30 | Refills: 0
Start: 2024-02-27 | End: 2024-03-27

## 2024-02-27 RX ORDER — DEXTROSE 50 % IN WATER 50 %
50 SYRINGE (ML) INTRAVENOUS ONCE
Refills: 0 | Status: COMPLETED | OUTPATIENT
Start: 2024-02-27 | End: 2024-02-27

## 2024-02-27 RX ORDER — ASPIRIN/CALCIUM CARB/MAGNESIUM 324 MG
1 TABLET ORAL
Qty: 30 | Refills: 0
Start: 2024-02-27 | End: 2024-03-27

## 2024-02-27 RX ADMIN — ONDANSETRON 4 MILLIGRAM(S): 8 TABLET, FILM COATED ORAL at 06:57

## 2024-02-27 RX ADMIN — Medication 50 MILLILITER(S): at 08:45

## 2024-02-27 RX ADMIN — Medication 81 MILLIGRAM(S): at 11:01

## 2024-02-27 RX ADMIN — Medication 650 MILLIGRAM(S): at 11:02

## 2024-02-27 RX ADMIN — METHADONE HYDROCHLORIDE 55 MILLIGRAM(S): 40 TABLET ORAL at 11:02

## 2024-02-27 RX ADMIN — INSULIN HUMAN 10 UNIT(S): 100 INJECTION, SOLUTION SUBCUTANEOUS at 08:46

## 2024-02-27 RX ADMIN — Medication 200 GRAM(S): at 10:25

## 2024-02-27 RX ADMIN — Medication 650 MILLIGRAM(S): at 12:47

## 2024-02-27 RX ADMIN — ENOXAPARIN SODIUM 40 MILLIGRAM(S): 100 INJECTION SUBCUTANEOUS at 06:16

## 2024-02-27 RX ADMIN — SODIUM CHLORIDE 100 MILLILITER(S): 9 INJECTION, SOLUTION INTRAVENOUS at 08:45

## 2024-02-27 NOTE — DISCHARGE NOTE PROVIDER - HOSPITAL COURSE
39yo woman hx of opioid misuse, stable on methadone for 9 years, presents w/ sudden onset painless vision loss.    Pt endorses she was in herusual state of health prior to 1 wk ago when she developed upper respiratory sxs (congestion, unproductive cough), she has been improving; this AM when she went to place her contacts she noted loss of peripheral vision from right eye, and endorses on applying her contacts that her eye globe felt "mushy"; presented to ED. Pt endorses that her vision has returned with no deficits.     Of note pt has hx of ophthalmological complaints, wears contacts and glasses, endorses extensive glaucoma w/u with in the past 3 months that was negative.     Pt denies fevers, CP, sob, numbness/tingling, n/v/d, endorses feeling mostly returned to her baseline state.     ED  /74 HR 99 T 97.7F 99% on RA  WBC 4.70  CTH negative    Pt was given aspirin 324, 1L LR, atorvastatin 80, ondansatron 4 IV;   Was a code stroke  Admitted to ICU for q4 neuro checks    Visual defect now resolved. CTH, CTA H/N/P, and MRI head and orbits were negative for any pathology. Was started on aspirin and got a dose of plavix. Pt is medically stable to be discharged home today with close followup with the neurologist outpatient.

## 2024-02-27 NOTE — CONSULT NOTE ADULT - ASSESSMENT
Assessment  - Episode of transient visual loss right eye on awakening 02/24/24  Pt had work up by neurology.  Eye exam today  did  not reveal any ocular etiology to this episode    - h/o Moderate myopia OU    Plan  - Continue with neuro rec's  pt  was recommended to f/u with PCP and neuro  Pt given Ophthalmology appt with Dr. Luis Enrique Carvalho MD   in 1 month  Pt educated on signs and sxs of RD and was encouraged to call the office   if any change in signs or symptoms in eyes/vision.      Case d/w Dr. Luis Enrique Carvalho. MD

## 2024-02-27 NOTE — PHYSICAL THERAPY INITIAL EVALUATION ADULT - GENERAL OBSERVATIONS, REHAB EVAL
10:50 -11:20 Chart reviewed. Order received.  Patient is ok to be  seen for PT,confirmed with RN. pt encountered  Semi moctezuma in the bed denies pain, and agrees to participate in session, +  Heplock , +  Tele ,NAD.

## 2024-02-27 NOTE — PROGRESS NOTE ADULT - ASSESSMENT
39yo woman with a medical hx of opioid misuse, stable on methadone for 9 years, presents with  transient R sided vision loss.    transient vision loss - possible  TIA     - MRI brain negative   - may DC home today on aspirin and Lipitor   - optho and neuro f/u as outpt    - home meds   - 34min spent on DC

## 2024-02-27 NOTE — PROGRESS NOTE ADULT - SUBJECTIVE AND OBJECTIVE BOX
Patient seen and evaluated this am, no complaints       T(F): 97.7 (02-27-24 @ 07:05), Max: 97.7 (02-27-24 @ 07:05)  HR: 87 (02-27-24 @ 07:07)  BP: 107/57 (02-27-24 @ 07:07)  RR: 17 (02-27-24 @ 07:07)  SpO2: 97% (02-27-24 @ 07:07) (96% - 98%)    PHYSICAL EXAM:  GENERAL: NAD  HEAD:  Atraumatic, Normocephalic  EYES: EOMI, PERRLA, conjunctiva and sclera clear  NERVOUS SYSTEM:  Alert & Oriented X3, no focal deficits   CHEST/LUNG: Clear to percussion bilaterally; No rales, rhonchi, wheezing, or rubs  HEART: Regular rate and rhythm; No murmurs, rubs, or gallops  ABDOMEN: Soft, Nontender, Nondistended; Bowel sounds present  EXTREMITIES:  2+ Peripheral Pulses, No clubbing, cyanosis, or edema    LABS  02-27    141  |  101  |  13  ----------------------------<  103<H>  5.9<H>   |  30  |  0.9    Ca    10.0      27 Feb 2024 06:12  Mg     2.1     02-27    TPro  6.5  /  Alb  4.3  /  TBili  0.5  /  DBili  x   /  AST  12  /  ALT  11  /  AlkPhos  47  02-27                          12.5   5.68  )-----------( 193      ( 27 Feb 2024 06:12 )             35.1     < from: 12 Lead ECG (02.26.24 @ 08:11) >  Diagnosis Line Normal sinus rhythm  Normal ECG    < end of copied text >      RADIOLOGY  < from: MR Orbit, Face, and/or Neck No Cont (02.26.24 @ 13:01) >    IMPRESSION:  Unremarkable noncontrast brain and orbits MRI.    < end of copied text >    MEDICATIONS  (STANDING):  aspirin  chewable 81 milliGRAM(s) Oral daily  atorvastatin 80 milliGRAM(s) Oral at bedtime  calcium gluconate IVPB 2 Gram(s) IV Intermittent once  chlorhexidine 2% Cloths 1 Application(s) Topical <User Schedule>  enoxaparin Injectable 40 milliGRAM(s) SubCutaneous every 24 hours  lactated ringers. 1000 milliLiter(s) (100 mL/Hr) IV Continuous <Continuous>  melatonin 20 milliGRAM(s) Oral at bedtime  methadone    Tablet 55 milliGRAM(s) Oral daily    MEDICATIONS  (PRN):  acetaminophen     Tablet .. 650 milliGRAM(s) Oral every 6 hours PRN Temp greater or equal to 38C (100.4F), Mild Pain (1 - 3)  aluminum hydroxide/magnesium hydroxide/simethicone Suspension 30 milliLiter(s) Oral every 4 hours PRN Dyspepsia  ondansetron Injectable 4 milliGRAM(s) IV Push every 8 hours PRN Nausea and/or Vomiting      Patient seen and evaluated this am, no complaints       T(F): 97.7 (02-27-24 @ 07:05), Max: 97.7 (02-27-24 @ 07:05)  HR: 87 (02-27-24 @ 07:07)  BP: 107/57 (02-27-24 @ 07:07)  RR: 17 (02-27-24 @ 07:07)  SpO2: 97% (02-27-24 @ 07:07) (96% - 98%)    PHYSICAL EXAM:  GENERAL: NAD  HEAD:  Atraumatic, Normocephalic  EYES: EOMI, PERRLA, conjunctiva and sclera clear  NERVOUS SYSTEM:  Alert & Oriented X3, no focal deficits   CHEST/LUNG: Clear to percussion bilaterally; No rales, rhonchi, wheezing, or rubs  HEART: Regular rate and rhythm; No murmurs, rubs, or gallops  ABDOMEN: Soft, Nontender, Nondistended; Bowel sounds present  EXTREMITIES:  2+ Peripheral Pulses, No clubbing, cyanosis, or edema    LABS  02-27    141  |  101  |  13  ----------------------------<  103<H>  5.9<H>   |  30  |  0.9    Ca    10.0      27 Feb 2024 06:12  Mg     2.1     02-27    TPro  6.5  /  Alb  4.3  /  TBili  0.5  /  DBili  x   /  AST  12  /  ALT  11  /  AlkPhos  47  02-27                          12.5   5.68  )-----------( 193      ( 27 Feb 2024 06:12 )             35.1     < from: 12 Lead ECG (02.26.24 @ 08:11) >  Diagnosis Line Normal sinus rhythm  Normal ECG    < end of copied text >      RADIOLOGY  < from: MR Orbit, Face, and/or Neck No Cont (02.26.24 @ 13:01) >    IMPRESSION:  Unremarkable noncontrast brain and orbits MRI.    < end of copied text >    MEDICATIONS  (STANDING):  aspirin  chewable 81 milliGRAM(s) Oral daily  atorvastatin 80 milliGRAM(s) Oral at bedtime  calcium gluconate IVPB 2 Gram(s) IV Intermittent once  chlorhexidine 2% Cloths 1 Application(s) Topical <User Schedule>  enoxaparin Injectable 40 milliGRAM(s) SubCutaneous every 24 hours  melatonin 20 milliGRAM(s) Oral at bedtime  methadone    Tablet 55 milliGRAM(s) Oral daily    MEDICATIONS  (PRN):  acetaminophen     Tablet .. 650 milliGRAM(s) Oral every 6 hours PRN Temp greater or equal to 38C (100.4F), Mild Pain (1 - 3)  aluminum hydroxide/magnesium hydroxide/simethicone Suspension 30 milliLiter(s) Oral every 4 hours PRN Dyspepsia  ondansetron Injectable 4 milliGRAM(s) IV Push every 8 hours PRN Nausea and/or Vomiting

## 2024-02-27 NOTE — SWALLOW BEDSIDE ASSESSMENT ADULT - SLP GENERAL OBSERVATIONS
Pt received in bed awake w/ breakfast tray. Pt reports brain fog, resolution of vision symptoms, cognitive difficulty reading numbers off her id last night, SLP observed current ability to read them. Pt reported no changes/ difficulty w/ speech, language, or swallowing.

## 2024-02-27 NOTE — DISCHARGE NOTE PROVIDER - NSDCCPCAREPLAN_GEN_ALL_CORE_FT
PRINCIPAL DISCHARGE DIAGNOSIS  Diagnosis: Vision changes  Assessment and Plan of Treatment: You came in for vision changes. CT of the head and CT angio of the heart and neck and perfusion studies were all normal. You were admitted to ICU for neuro checks. Your vision loss completely resolved before you came into the ED. MRI of the head and eyes were negative. You are medically stable to be discharged home. The changes could have been due to an eye issue or it could have been a transient ichemic attack and so you will be discharged home with aspirin 81mg with close followup with the neurologist outpatient.

## 2024-02-27 NOTE — SWALLOW BEDSIDE ASSESSMENT ADULT - SLP PERTINENT HISTORY OF CURRENT PROBLEM
39yo woman hx of opioid misuse, stable on methadone for 9 years, presents w/ sudden onset painless vision loss. Pt endorses she was in herusual state of health prior to 1 wk ago when she developed upper respiratory sxs (congestion, unproductive cough), she has been improving; this AM when she went to place her contacts she noted loss of peripheral vision from right eye, and endorses on applying her contacts that her eye globe felt "mushy"; presented to ED. Pt endorses that her vision has returned with no deficits. Of note pt has hx of ophthalmological complaints, wears contacts and glasses, endorses extensive glaucoma w/u with in the past 3 months that was negative. Pt denies fevers, CP, sob, numbness/tingling, n/v/d, endorses feeling mostly returned to her baseline state.

## 2024-02-27 NOTE — DISCHARGE NOTE PROVIDER - CARE PROVIDER_API CALL
[facility]  [address] - [address]  [place] [address]  Phone: [phone]  Fax: [phone]  Follow Up Time: 1 week    Brian Mnajarrez  Neurology  [address], [address]  [place] [address]  Phone: [phone]  Fax: [phone]  Follow Up Time: 1-3 days

## 2024-02-27 NOTE — CONSULT NOTE ADULT - SUBJECTIVE AND OBJECTIVE BOX
YOANNA SHAW  MRN-[id number]  38y Female        Patient is a 38y old  Female who presents with a chief complaint of R/o stroke/TIA (27 Feb 2024 10:03)    HEALTH ISSUES - R/O PROBLEM Dx:    HPI:  37yo woman hx of opioid misuse, stable on methadone for 9 years, presents w/ sudden onset painless vision loss.    Pt endorses she was in herusual state of health prior to 1 wk ago when she developed upper respiratory sxs (congestion, unproductive cough), she has been improving; this AM when she went to place her contacts she noted loss of peripheral vision from right eye, and endorses on applying her contacts that her eye globe felt "mushy"; presented to ED. Pt endorses that her vision has returned with no deficits.     Of note pt has hx of ophthalmological complaints, wears contacts and glasses, endorses extensive glaucoma w/u with in the past 3 months that was negative.     Pt denies fevers, CP, sob, numbness/tingling, n/v/d, endorses feeling mostly returned to her baseline state.     ED  /74 HR 99 T 97.7F 99% on RA  WBC 4.70  CTH negative    Pt was given aspirin 324, 1L LR, atorvastatin 80, ondansatron 4 IV;   Was a code stroke  Admitted to ICU for q4 neuro checks (24 Feb 2024 14:18)    PAST MEDICAL & SURGICAL HISTORY:  No pertinent past medical history      No significant past surgical history        MEDICATIONS  (STANDING):  aspirin  chewable 81 milliGRAM(s) Oral daily  atorvastatin 40 milliGRAM(s) Oral at bedtime  chlorhexidine 2% Cloths 1 Application(s) Topical <User Schedule>  enoxaparin Injectable 40 milliGRAM(s) SubCutaneous every 24 hours  melatonin 20 milliGRAM(s) Oral at bedtime  methadone    Tablet 55 milliGRAM(s) Oral daily    MEDICATIONS  (PRN):  acetaminophen     Tablet .. 650 milliGRAM(s) Oral every 6 hours PRN Temp greater or equal to 38C (100.4F), Mild Pain (1 - 3)  aluminum hydroxide/magnesium hydroxide/simethicone Suspension 30 milliLiter(s) Oral every 4 hours PRN Dyspepsia  ondansetron Injectable 4 milliGRAM(s) IV Push every 8 hours PRN Nausea and/or Vomiting    Allergies    No Known Allergies    Intolerances      HEALTH ISSUES - PROBLEM Dx:          JASE:  POHx:  FOHx: Non-contributory    Extended HPI:    Ocular Medications: none        EXTERNAL:    VISUAL ACUITY:       RIGHT EYE: sc/cc                            LEFT EYE: sc/cc     MANIFEST:    RIGHT EYE:                LEFT EYE:    NEURO TESTING  EXTRAOCULAR MOVEMENTS:  PUPILS:  VFc:    ANTERIOR SEGMENT EVALUATION: :    LIDS/ADENEXA:  CONJUNCTIVA/SCLERA:  CORNEA:  ANTERIOR CHAMBER:   IRIS/PUPIL:  LENS/VITREOUS:    INTRAOCULAR PRESSURE:   OD: mmHg  OS: mmHg  @    POSTERIOR SEGMENT EVALUATION  DFE: 1% Tropicamide, 2.5 % Phenylephrine OU    OPTIC NERVE:  MACULA:  A/V:   FUNDUS/PERIPHERY:    SUPPLEMENTAL TESTING:             YOANNA SHAW  MRN-[id number]  38y Female        Patient is a 38y old  Female who presents with a chief complaint of R/o stroke/TIA (27 Feb 2024 10:03)    HEALTH ISSUES - R/O PROBLEM Dx:    HPI:  39yo woman hx of opioid misuse, stable on methadone for 9 years, presents w/ sudden onset painless vision loss.    Pt endorses she was in herusual state of health prior to 1 wk ago when she developed upper respiratory sxs (congestion, unproductive cough), she has been improving; this AM when she went to place her contacts she noted loss of peripheral vision from right eye, and endorses on applying her contacts that her eye globe felt "mushy"; presented to ED. Pt endorses that her vision has returned with no deficits.     Of note pt has hx of ophthalmological complaints, wears contacts and glasses, endorses extensive glaucoma w/u with in the past 3 months that was negative.     Pt denies fevers, CP, sob, numbness/tingling, n/v/d, endorses feeling mostly returned to her baseline state.     ED  /74 HR 99 T 97.7F 99% on RA  WBC 4.70  CTH negative    Pt was given aspirin 324, 1L LR, atorvastatin 80, ondansatron 4 IV;   Was a code stroke  Admitted to ICU for q4 neuro checks (24 Feb 2024 14:18)    PAST MEDICAL & SURGICAL HISTORY:  No pertinent past medical history      No significant past surgical history        MEDICATIONS  (STANDING):  aspirin  chewable 81 milliGRAM(s) Oral daily  atorvastatin 40 milliGRAM(s) Oral at bedtime  chlorhexidine 2% Cloths 1 Application(s) Topical <User Schedule>  enoxaparin Injectable 40 milliGRAM(s) SubCutaneous every 24 hours  melatonin 20 milliGRAM(s) Oral at bedtime  methadone    Tablet 55 milliGRAM(s) Oral daily    MEDICATIONS  (PRN):  acetaminophen     Tablet .. 650 milliGRAM(s) Oral every 6 hours PRN Temp greater or equal to 38C (100.4F), Mild Pain (1 - 3)  aluminum hydroxide/magnesium hydroxide/simethicone Suspension 30 milliLiter(s) Oral every 4 hours PRN Dyspepsia  ondansetron Injectable 4 milliGRAM(s) IV Push every 8 hours PRN Nausea and/or Vomiting    Allergies    No Known Allergies    Intolerances      HEALTH ISSUES - PROBLEM Dx:          JASE: 2 months - CL; 4 months - Gl specialist  POHx: myopia; + CL wear ( 2 week replacement; daily - Acuvue Oaysis - 5.00 OU  FOHx: pallavi myopia - mother    Extended HPI: see above HPI;Pt with recent URI week prior with nausea cough, fever that improved over the week. Woke up the morning of 2/24/24 noted decreased area in FOV to the right eye (temporal and superior). She placed in her contact lenses and noted that the right eye felt softer than the left eye. Lasted about 1 hr. After resolution she noted a pounding sensation behind the eye and right sided HA that has improved. She still c/o some nausea and issues with olfaction. Pt has been wearing CL's since admittance. No prior episodes no flashes, diplopia During in patient stay pt placed on statin and ASA    Ocular Medications: none        EXTERNAL:    VISUAL ACUITY:       RIGHT EYE: cc: 20/20-                        LEFT EYE: cc: 20/20    (pt removed CL for this exam and told not to place back in and upon dischare  to discard this pair and replace with a new pair, not waer lenses today      NEURO TESTING  EXTRAOCULAR MOVEMENTS: full OU  PUPILS: PERRL; No APD  VFc: FTFC OU    ANTERIOR SEGMENT EVALUATION: :    LIDS/ADENEXA: clear OU  CONJUNCTIVA/SCLERA: clear OU  CORNEA: clear OU  ANTERIOR CHAMBER:  deep/quiet OU  IRIS/PUPIL: flat/round OU  LENS/VITREOUS: trace NS OU; no vitreal cells OU    INTRAOCULAR PRESSURE:   OD:  16mmHg  OS:  16mmHg  @ 12:30pm    POSTERIOR SEGMENT EVALUATION  DFE: 1% Tropicamide, 2.5 % Phenylephrine OU    OPTIC NERVE: 0.5/0.6 (lasrger ONH OS)  MACULA: flat OU  A/V: 2/3 OU  FUNDUS/PERIPHERY: flat OU    SUPPLEMENTAL TESTING:  OCT macula - flat OU  ONH - 96/95 - no NFL defect OU

## 2024-02-27 NOTE — PROGRESS NOTE ADULT - SUBJECTIVE AND OBJECTIVE BOX
YOANNA SHAW 38y Female  MRN#: [id number]   CODE STATUS:________    Hospital Day: 2d    Pt is currently admitted with the primary diagnosis of vision loss     SUBJECTIVE  HPI   39yo woman with history of severe myopia presents w/ sudden onset painless vision loss.    Patient reports feeling sick for the last week with some congestion and unproductive cough which had been improving. She has also had some shakiness and uneasiness. Yesterday she. woke up feeling hot and weak. She noticed that the right side of her eye sight felt that there was a “curtain” over it. When she went to place her contacts she noticed her eyes globe felt mushy on the right side. She presented to the ED but by then her visual field defect had resolved.     Pt denies fevers, CP, sob, numbness/tingling, n/v/d. In the ED, vitals were stable, WBC 4.0 and CTH and CT perfusion were negative. Pt was given aspirin 324, 1L LR, atorvastatin 80, ondansatron 4 IV.   Admitted to ICU for q4 neuro checks    This morning reports feeling weak and HA since she came in. Of note, she has been tapering her methadone treatments in the last week.  She denies having any more vision change episodes. She just feels fluctuation between hot and cold.      Present Today:   - Calixto:  No [x  ], Yes [   ] : Indication:     - Type of IV Access:       .. CVC/Piccline:  No [x  ], Yes [   ] : Indication:       .. Midline: No [x  ], Yes [   ] : Indication:                                             ----------------------------------------------------------  OBJECTIVE  PAST MEDICAL & SURGICAL HISTORY  No pertinent past medical history    No significant past surgical history                                              -----------------------------------------------------------  ALLERGIES:  No Known Allergies                                            ------------------------------------------------------------    HOME MEDICATIONS  Home Medications:  methadone 10 mg oral tablet: 57 milligram(s) orally once a day (24 Feb 2024 19:02)                           MEDICATIONS:  STANDING MEDICATIONS  aspirin  chewable 81 milliGRAM(s) Oral daily  atorvastatin 80 milliGRAM(s) Oral at bedtime  chlorhexidine 2% Cloths 1 Application(s) Topical <User Schedule>  clopidogrel Tablet 75 milliGRAM(s) Oral daily  enoxaparin Injectable 40 milliGRAM(s) SubCutaneous every 24 hours  melatonin 20 milliGRAM(s) Oral at bedtime  methadone    Tablet 55 milliGRAM(s) Oral daily  metoclopramide  IVPB 10 milliGRAM(s) IV Intermittent once    PRN MEDICATIONS  acetaminophen     Tablet .. 650 milliGRAM(s) Oral every 6 hours PRN  aluminum hydroxide/magnesium hydroxide/simethicone Suspension 30 milliLiter(s) Oral every 4 hours PRN  LORazepam     Tablet 1 milliGRAM(s) Oral once PRN  ondansetron Injectable 4 milliGRAM(s) IV Push every 8 hours PRN                                            ------------------------------------------------------------  VITAL SIGNS: Last 24 Hours  T(C): 36.5 (02-27-24 @ 07:05), Max: 36.5 (02-27-24 @ 07:05)  HR: 87 (02-27-24 @ 07:07) (59 - 110)  BP: 107/57 (02-27-24 @ 07:07) (107/57 - 142/66)  RR: 17 (02-27-24 @ 07:07) (10 - 32)  SpO2: 97% (02-27-24 @ 07:07) (96% - 98%)    02-26-24 @ 07:01  -  02-27-24 @ 07:00  --------------------------------------------------------  IN: 120 mL / OUT: 0 mL / NET: 120 mL                            12.5   5.68  )-----------( 193      ( 27 Feb 2024 06:12 )             35.1     02-27    141  |  101  |  13  ----------------------------<  103<H>  5.9<H>   |  30  |  0.9    Ca    10.0      27 Feb 2024 06:12  Mg     2.1     02-27    TPro  6.5  /  Alb  4.3  /  TBili  0.5  /  DBili  x   /  AST  12  /  ALT  11  /  AlkPhos  47  02-27    CAPILLARY BLOOD GLUCOSE      POCT Blood Glucose.: 108 mg/dL (27 Feb 2024 08:42)      Urinalysis Basic - ( 27 Feb 2024 06:12 )    Color: x / Appearance: x / SG: x / pH: x  Gluc: 103 mg/dL / Ketone: x  / Bili: x / Urobili: x   Blood: x / Protein: x / Nitrite: x   Leuk Esterase: x / RBC: x / WBC x   Sq Epi: x / Non Sq Epi: x / Bacteria: x                                                 -------------------------------------------------------------  RADIOLOGY:    MR ORBIT   MR BRAIN     02/26/2024        FINDINGS:    BRAIN:  The ventricles and sulci are within normal limits.    There are no areas of abnormal signal within the brain parenchyma.    There is no intraparenchymal hematoma, mass effect or midline shift.    No abnormal extra-axial fluid collections are present.    There is no diffusion abnormality to suggest acute or subacute infarction.    Major flow-voids at the base of the brain follow expected course and   contour.    The calvarium is intact. There are mild mucosal thickening of the ethmoid   air cells. The tympanomastoid cavities appear free of acute disease.    ORBITS:  Globes are symmetric and intact.  Extraocular muscles and optic nerves   are symmetric and intact. The intracranial optic nerves, optic chiasm and   optic tracts demonstrate normal signal and course.    The soft tissue and bone orbital confines are intact without signal   abnormality.    No additional abnormal signal within visualized deep spaces of the neck.    IMPRESSION:  Unremarkable noncontrast brain and orbits MRI.                                              --------------------------------------------------------------    PHYSICAL EXAM:  PHYSICAL EXAM:  GENERAL: NAD, lying in bed comfortably  HEAD:  Atraumatic, Normocephalic  EYES: EOMI, PERRLA, conjunctiva and sclera clear  ENT: Moist mucous membranes  NECK: Supple, No JVD  CHEST/LUNG: Clear to auscultation bilaterally; No rales, rhonchi, wheezing, or rubs. Unlabored respirations  HEART: Regular rate and rhythm; No murmurs, rubs, or gallops  ABDOMEN: Bowel sounds present; Soft, Nontender, Nondistended. No hepatomegaly  EXTREMITIES:  2+ Peripheral Pulses, no edema  NERVOUS SYSTEM:  Alert & Oriented X3, No visual field defects. speech clear. No deficits   MSK: FROM all 4 extremities, full and equal strength  SKIN: No rashes or lesions                                         --------------------------------------------------------------

## 2024-02-27 NOTE — DISCHARGE NOTE PROVIDER - CARE PROVIDERS DIRECT ADDRESSES
,[email or web address]

## 2024-02-27 NOTE — DISCHARGE NOTE NURSING/CASE MANAGEMENT/SOCIAL WORK - PATIENT PORTAL LINK FT
You can access the FollowMyHealth Patient Portal offered by [facility] by registering at the following website: [email or web address]. By joining [employer]’s FollowMyHealth portal, you will also be able to view your health information using other applications (apps) compatible with our system.

## 2024-02-27 NOTE — DISCHARGE NOTE PROVIDER - NSDCMRMEDTOKEN_GEN_ALL_CORE_FT
methadone 10 mg oral tablet: 57 milligram(s) orally once a day   aspirin 81 mg oral tablet, chewable: 1 tab(s) orally once a day  atorvastatin 40 mg oral tablet: 1 tab(s) orally once a day (at bedtime)  methadone 10 mg oral tablet: 57 milligram(s) orally once a day

## 2024-02-27 NOTE — PHYSICAL THERAPY INITIAL EVALUATION ADULT - PERTINENT HX OF CURRENT PROBLEM, REHAB EVAL
37yo woman hx of opioid misuse, stable on methadone for 9 years, presents w/ sudden onset painless vision loss.    Pt endorses she was in herusual state of health prior to 1 wk ago when she developed upper respiratory sxs (congestion, unproductive cough), she has been improving; this AM when she went to place her contacts she noted loss of peripheral vision from right eye, and endorses on applying her contacts that her eye globe felt "mushy"; presented to ED. Pt endorses that her vision has returned with no deficits.     Of note pt has hx of ophthalmological complaints, wears contacts and glasses, endorses extensive glaucoma w/u with in the past 3 months that was negative.     Pt denies fevers, CP, sob, numbness/tingling, n/v/d, endorses feeling mostly returned to her baseline state.

## 2024-02-27 NOTE — PHYSICAL THERAPY INITIAL EVALUATION ADULT - NSACTIVITYREC_GEN_A_PT
Patient  independent W/ Bed mobility , transfer , Supervision W/ Ambulation and stair nego W/o AD and W/ good static / Dynamic balance , Skill PT not recommended at  this time . Reconsult Skill PT in future as appropriated .  Am Pac Mobility Score : 22

## 2024-02-27 NOTE — CONSULT NOTE ADULT - CONSULT REASON
37yo woman hx of opioid misuse on methadone (stable 9 years) presents after a 1wk course of upper respiratory sxs w/ sudden onset painless vision loss of R eye associated w/ a "soft" globe. Vision has returned, going to MICU for q4 neuro checks (initial ct head unremarkable)
TIA

## 2024-02-27 NOTE — PHYSICAL THERAPY INITIAL EVALUATION ADULT - ADDITIONAL COMMENTS
As per pt  She lives in a PH W/ 2 Steps to enter W/ one HR And 7 steps to basement  W/ BL HR going to basement , as per pt  She was independent W/ All functional activity PTA and was driving and  In Nursing school .

## 2024-02-27 NOTE — DISCHARGE NOTE PROVIDER - PROVIDER TOKENS
PROVIDER:[TOKEN:[[id number]],FOLLOWUP:[1 week]],PROVIDER:[TOKEN:[[id number]],FOLLOWUP:[1-3 days]]

## 2024-03-05 DIAGNOSIS — G45.9 TRANSIENT CEREBRAL ISCHEMIC ATTACK, UNSPECIFIED: ICD-10-CM

## 2024-03-05 DIAGNOSIS — H53.121 TRANSIENT VISUAL LOSS, RIGHT EYE: ICD-10-CM

## 2024-03-05 DIAGNOSIS — F11.20 OPIOID DEPENDENCE, UNCOMPLICATED: ICD-10-CM

## 2024-03-05 DIAGNOSIS — E87.5 HYPERKALEMIA: ICD-10-CM

## 2024-03-11 ENCOUNTER — APPOINTMENT (OUTPATIENT)
Dept: NEUROLOGY | Facility: CLINIC | Age: 39
End: 2024-03-11
Payer: MEDICAID

## 2024-03-11 VITALS
HEART RATE: 80 BPM | HEIGHT: 69 IN | BODY MASS INDEX: 24.44 KG/M2 | SYSTOLIC BLOOD PRESSURE: 130 MMHG | WEIGHT: 165 LBS | DIASTOLIC BLOOD PRESSURE: 80 MMHG

## 2024-03-11 DIAGNOSIS — Z82.3 FAMILY HISTORY OF STROKE: ICD-10-CM

## 2024-03-11 PROCEDURE — 99204 OFFICE O/P NEW MOD 45 MIN: CPT

## 2024-03-11 RX ORDER — METHADONE HYDROCHLORIDE 10 MG/5ML
10 SOLUTION ORAL DAILY
Refills: 0 | Status: DISCONTINUED | COMMUNITY
End: 2024-03-11

## 2024-03-11 NOTE — ASSESSMENT
[FreeTextEntry1] : Ms. SHAW 38 year old female presents for a hospital follow up regarding vision changes. Also reports that she had an episode of speech difficulty in hospital, which self resolved. Unclear etiology given negative neuroimaging. Continue to treat as a tia for now, also may be presyncope in nature- advised to establish care with EP. Low suspicion for seizures but will rule out with REEG/48 Hr EEG. Now is back to her baseline w/o new sx, exam is non focal.   Plan: -REEG/48 hr EEG  -EP referral  -RTC with attending

## 2024-03-11 NOTE — HISTORY OF PRESENT ILLNESS
[FreeTextEntry1] : Ms. SHAW 38 year old female presents today for a hospital follow up visit. She was worked up with CT head, CTA H/N, MR head, and MR orbits which were all unremarkable for vision changes, and abnormal eye sensation. Unclear etiology but TIA was a differential, she was initially recommended to be DC on DAPT per neurology, but was dc on ASA 81 mg monotherapy. Also discharged on Lipitor 40 mg. Labs: TSH NL, A1c 5.2, LDL 59   Per pt had a URI, then a week later she woke up and was diaphoretic. Put her glasses on, and her right peripheral vision and upward vertical peripheral vision was impaired. She described it as a patch over the eye, she said central vision was intact, she was in a haze, left eye felt normal, the right eye felt mushy and soft. She also felt the pupil looked a different size as well. In hospital she felt like her processing time was poor while speaking but this also has not recurred. She also reports headaches throughout hospitalization Saw eye doctor while in hospital dilated eye exam was done- per patient they didn't see anything, she has a follow up with ophthalmology in April  Since discharge she feels fine, back to her baseline and is working. No vision issues since DC.  No other neurologic symptoms since discharge. She reports fam hx of strokes, quit smoking about 10 years ago, doesn't snore at night. Has had one presyncope sensation since dc, she felt flushing, not diaphoretic. No SOB  ___________________________________________________________________________________________________  Hospital Course: Discharge Date	27-Feb-2024 Admission Date	24-Feb-2024 12:26 Hospital Course	 37yo woman hx of opioid misuse, stable on methadone for 9 years, presents w/ sudden onset painless vision loss.   Pt endorses she was in herusual state of health prior to 1 wk ago when she developed upper respiratory sxs (congestion, unproductive cough), she has been improving; this AM when she went to place her contacts she noted loss of peripheral vision from right eye, and endorses on applying her contacts that her eye globe felt "mushy"; presented to ED. Pt endorses that her vision has returned with no deficits.   Of note pt has hx of ophthalmological complaints, wears contacts and glasses, endorses extensive glaucoma w/u with in the past 3 months that was negative.   Pt denies fevers, CP, sob, numbness/tingling, n/v/d, endorses feeling mostly returned to her baseline state.   ED /74 HR 99 T 97.7F 99% on RA WBC 4.70 CTH negative   Pt was given aspirin 324, 1L LR, atorvastatin 80, ondansatron 4 IV; Was a code stroke Admitted to ICU for q4 neuro checks   Visual defect now resolved. CTH, CTA H/N/P, and MRI head and orbits were negative for any pathology. Was started on aspirin and got a dose of plavix. Pt is medically stable to be discharged home today with close followup with the neurologist outpatient.     Med Reconciliation: Medication Reconciliation Status	Admission Reconciliation is Not Complete Discharge Reconciliation is Completed Discharge Medications	aspirin 81 mg oral tablet, chewable: 1 tab(s) orally once a day atorvastatin 40 mg oral tablet: 1 tab(s) orally once a day (at bedtime) methadone 10 mg oral tablet: 57 milligram(s) orally once a day , , Care Plan/Procedures: Discharge Diagnoses, Assessment and Plan of Treatment	PRINCIPAL DISCHARGE DIAGNOSIS Diagnosis: Vision changes Assessment and Plan of Treatment: You came in for vision changes. CT of the head and CT angio of the heart and neck and perfusion studies were all normal. You were admitted to ICU for neuro checks. Your vision loss completely resolved before you came into the ED. MRI of the head and eyes were negative. You are medically stable to be discharged home. The changes could have been due to an eye issue or it could have been a transient ichemic attack and so you will be discharged home with aspirin 81mg with close followup with the neurologist outpatient. Goal(s)	To get better and follow your care plan as instructed. Follow Up: Care Providers for Follow up (PCP/Outpatient Provider)	[facility] [address], Admin - Room [address] [place] [id number] Phone: [phone] Fax: [phone] Follow Up Time: 1 week   [facility] [address] [place] [address], [address] [place] [address] Phone: [phone] Fax: [phone] Follow Up Time: 1-3 days Additional Provider Info (For SysAdmin Use Only)	 PROVIDER:[TOKEN:[[id number]],FOLLOWUP:[1 week]],PROVIDER:[TOKEN:[[id number]],FOLLOWUP:[1-3 days]] Care Providers Direct Addresses (For SYSAdmin Use Only)	 ,[email or web address] [email or web address] number (For SysAdmin Use Only) :	[[id number]],[[id number]] Discharge Diet	Regular Diet - No restrictions Activity	No restrictions Quality Measures: Patient Condition	Stable Hospice Patient	No Core Measure Site	No Does the patient have difficulty running errands alone like visiting a doctors office or shopping?	No Does the patient have difficulty climbing stairs?	No Cognition: The patient has	No difficulties Does the patient have a principal diagnosis of ischemic stroke, hemorrhagic stroke, or TIA?	No Does the patient have a principal diagnosis of Acute Myocardial Infarction?	No Has the patient had a Percutaneous Coronary Intervention?	No Document Complete: Physician Section Complete	This document is complete and the patient is ready for discharge. For questions about your prescriptions, please call:	[phone] Is this contact telephone number correct?	Yes Attending Attestation Statement	I have personally seen and examined the patient. I have collaborated with and supervised the .	on the discharge service for the patient. I have reviewed and made amendments to the documentation where necessary.     Electronic Signatures: Nila Stark)  (Signed 27-Feb-2024 14:15) 	Authored: Hospital Course, Med Reconciliation, Care Plan/Procedures, Follow Up, Quality Measures, Document Complete [place] Shane)  (Signature Pending) 	Co-Signer: Hospital Course, Med Reconciliation, Care Plan/Procedures, Follow Up, Quality Measures, Covid Information, Document Complete     Last Updated: 27-Feb-2024 14:15 by Nila Stark)

## 2024-03-11 NOTE — PHYSICAL EXAM
[FreeTextEntry1] : Mental status: Awake, alert and oriented x3.  Recent and remote memory intact.  Naming, repetition and comprehension intact.  Attention/concentration intact.  No dysarthria, no aphasia.  Fund of knowledge appropriate.   Cranial nerves: Pupils equally round and reactive to light, visual fields full, no nystagmus, extraocular muscles intact, V1 through V3 intact bilaterally and symmetric, face symmetric, hearing intact to finger rub, palate elevation symmetric, tongue was midline.  Motor:  MRC grading 5/5 b/l UE/LE.   strength 5/5.  Normal tone and bulk.  No abnormal movements.   Sensation: Intact to light touch, temperature, and vibration  Coordination: No dysmetria on finger-to-nose  Reflexes: 2+ in bilateral UE/LE.  Gait: Narrow and steady. No ataxia.

## 2024-03-28 ENCOUNTER — APPOINTMENT (OUTPATIENT)
Dept: ELECTROPHYSIOLOGY | Facility: CLINIC | Age: 39
End: 2024-03-28

## 2024-04-03 ENCOUNTER — APPOINTMENT (OUTPATIENT)
Dept: ELECTROPHYSIOLOGY | Facility: CLINIC | Age: 39
End: 2024-04-03
Payer: MEDICAID

## 2024-04-03 VITALS
SYSTOLIC BLOOD PRESSURE: 138 MMHG | BODY MASS INDEX: 24.88 KG/M2 | HEART RATE: 81 BPM | TEMPERATURE: 97.5 F | WEIGHT: 168 LBS | DIASTOLIC BLOOD PRESSURE: 79 MMHG | HEIGHT: 69 IN | RESPIRATION RATE: 18 BRPM

## 2024-04-03 DIAGNOSIS — Z87.891 PERSONAL HISTORY OF NICOTINE DEPENDENCE: ICD-10-CM

## 2024-04-03 PROCEDURE — 93000 ELECTROCARDIOGRAM COMPLETE: CPT

## 2024-04-03 PROCEDURE — 99204 OFFICE O/P NEW MOD 45 MIN: CPT | Mod: 25

## 2024-04-03 RX ORDER — ATORVASTATIN CALCIUM 40 MG/1
40 TABLET, FILM COATED ORAL
Refills: 0 | Status: COMPLETED | COMMUNITY
End: 2024-04-03

## 2024-04-03 RX ORDER — METHOTREXATE 2.5 MG/1
TABLET ORAL
Refills: 0 | Status: COMPLETED | COMMUNITY
End: 2024-04-03

## 2024-04-03 NOTE — DISCUSSION/SUMMARY
[EKG obtained to assist in diagnosis and management of assessed problem(s)] : EKG obtained to assist in diagnosis and management of assessed problem(s) [FreeTextEntry1] : I have recommended an event monitor to further evaluate her symptoms to determine if the symptoms may be related to a cardiac arrhythmia.  I educated the patient about the patient symptom activator feature and I have asked her to activate the event monitor whenever she has symptoms.

## 2024-04-03 NOTE — ASSESSMENT
[FreeTextEntry1] : # TIA - 30 day MCOT. Discussed ILR but pt prefers noninvasive monitoring first.  - Check 2D echo  # Dizziness - Encouraged hydration  I have also advised the patient to go to the nearest emergency room if she experiences any chest pain, dyspnea, syncope, or has any other compelling symptoms.  Follow up in 6 weeks

## 2024-04-03 NOTE — PHYSICAL EXAM
[Well Developed] : well developed [Well Nourished] : well nourished [No Acute Distress] : no acute distress [Normal Conjunctiva] : normal conjunctiva [Normal S1, S2] : normal S1, S2 [Clear Lung Fields] : clear lung fields [Good Air Entry] : good air entry [No Respiratory Distress] : no respiratory distress  [Soft] : abdomen soft [Normal Gait] : normal gait [No Edema] : no edema [Moves all extremities] : moves all extremities [Normal Speech] : normal speech [Alert and Oriented] : alert and oriented

## 2024-04-03 NOTE — HISTORY OF PRESENT ILLNESS
[FreeTextEntry1] :  Cardio: None PCP: Dr. 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 Does c/o occasional palpitations without triggers that last a few seconds at most. Episodes occur a few times a year (maybe once or twice). The patient denies chest pain, dyspnea, presyncope or syncope. Has history of passing out when younger while smoking weed. Occ lightheaded/dizzy when she gets up quickly [place] in the AM. Does not drink water. Drinks 2-3 cups of coffee/day.  MGM and Maternal great grandmother had CVAs.

## 2024-04-10 ENCOUNTER — APPOINTMENT (OUTPATIENT)
Dept: NEUROLOGY | Facility: CLINIC | Age: 39
End: 2024-04-10

## 2024-04-18 ENCOUNTER — APPOINTMENT (OUTPATIENT)
Dept: INTERNAL MEDICINE | Facility: CLINIC | Age: 39
End: 2024-04-18

## 2024-04-22 ENCOUNTER — APPOINTMENT (OUTPATIENT)
Dept: CARDIOLOGY | Facility: CLINIC | Age: 39
End: 2024-04-22
Payer: MEDICAID

## 2024-04-22 PROCEDURE — 93306 TTE W/DOPPLER COMPLETE: CPT

## 2024-05-13 ENCOUNTER — APPOINTMENT (OUTPATIENT)
Dept: NEUROLOGY | Facility: CLINIC | Age: 39
End: 2024-05-13
Payer: MEDICAID

## 2024-05-13 ENCOUNTER — NON-APPOINTMENT (OUTPATIENT)
Age: 39
End: 2024-05-13

## 2024-05-13 PROCEDURE — 95816 EEG AWAKE AND DROWSY: CPT

## 2024-05-29 ENCOUNTER — APPOINTMENT (OUTPATIENT)
Dept: ELECTROPHYSIOLOGY | Facility: CLINIC | Age: 39
End: 2024-05-29

## 2024-05-29 ENCOUNTER — APPOINTMENT (OUTPATIENT)
Dept: ELECTROPHYSIOLOGY | Facility: CLINIC | Age: 39
End: 2024-05-29
Payer: MEDICAID

## 2024-05-29 VITALS
BODY MASS INDEX: 24.44 KG/M2 | HEIGHT: 69 IN | HEART RATE: 81 BPM | WEIGHT: 165 LBS | DIASTOLIC BLOOD PRESSURE: 76 MMHG | SYSTOLIC BLOOD PRESSURE: 116 MMHG | TEMPERATURE: 97.1 F

## 2024-05-29 DIAGNOSIS — H53.9 UNSPECIFIED VISUAL DISTURBANCE: ICD-10-CM

## 2024-05-29 PROCEDURE — G2211 COMPLEX E/M VISIT ADD ON: CPT | Mod: NC,1L

## 2024-05-29 PROCEDURE — 93000 ELECTROCARDIOGRAM COMPLETE: CPT

## 2024-05-29 PROCEDURE — 99214 OFFICE O/P EST MOD 30 MIN: CPT | Mod: 25

## 2024-05-29 RX ORDER — METHADONE HYDROCHLORIDE 40 MG/1
40 TABLET ORAL DAILY
Refills: 0 | Status: ACTIVE | COMMUNITY

## 2024-05-29 RX ORDER — ASPIRIN 81 MG/1
81 TABLET ORAL DAILY
Refills: 0 | Status: ACTIVE | COMMUNITY

## 2024-05-29 NOTE — ASSESSMENT
[FreeTextEntry1] : # TIA - 30 day MCOT without AFib. Discussed ILR, which pt would like to consider but is leaning towards - 2D echo without structural heart disease  # Dizziness - Encouraged hydration  I have also advised the patient to go to the nearest emergency room if she experiences any chest pain, dyspnea, syncope, or has any other compelling symptoms.  Follow up 4-6 weeks after ILR implant for wound check with ACP

## 2024-05-29 NOTE — HISTORY OF PRESENT ILLNESS
[FreeTextEntry1] : Cardio: None PCP: Dr. 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 Does c/o occasional palpitations without triggers that last a few seconds at most. Episodes occur a few times a year (maybe once or twice). The patient denies chest pain, dyspnea, presyncope or syncope. Has history of passing out when younger while smoking weed. Occ lightheaded/dizzy when she gets up quickly [place] in the AM. Does not drink water. Drinks 2-3 cups of coffee/day.  MGM and Maternal great grandmother had CVAs.  5/29/24 Here for routine follow up of recent MCOT. No events. Occ palpitations that were c/w sinus rhythm. No other cardiovascular complaints.

## 2024-05-29 NOTE — DISCUSSION/SUMMARY
[FreeTextEntry1] : I explained the procedure in detail including benefits/risks/alternative. Risk involved is less than 1% of bleeding and infection. We also discussed remote monitoring in detail. All questions were answered and the patient would like to proceed with loop implant.  [EKG obtained to assist in diagnosis and management of assessed problem(s)] : EKG obtained to assist in diagnosis and management of assessed problem(s)

## 2024-05-29 NOTE — CARDIOLOGY SUMMARY
[de-identified] : 5/29/2024 NSR (HR 81 bpm) 4/3/2024 NSR (HR 81 bpm) [de-identified] : 4/3-5/8/24 29 days No AFib. sx c/w sinus rhythm. <1% PACs. < 1% PVCs. [de-identified] : never [de-identified] : 4/22/2024 EF 63%. No sig valvular disease. Normal LA size.  [de-identified] : never

## 2024-08-21 ENCOUNTER — APPOINTMENT (OUTPATIENT)
Dept: ELECTROPHYSIOLOGY | Facility: CLINIC | Age: 39
End: 2024-08-21

## 2024-09-23 NOTE — PROGRESS NOTE ADULT - ASSESSMENT
38 year old female with hx of opoid dependance presenting with right sided painless vision loss possibly due to TIA vs. primary ophthalmic disease.     #Visual field defect, now resolved   #possible TIA, r/o CVA  - s/p code stroke in the ED  - CT-Perfusion/CT-H WNL   - seen by neurology  - MRI Brain and Orbits WNL  - continue with aspirin and plavix as per neuro recs  - continue with lipitor 80mg- lipid panel noted  - TSH pending  - A1C WNL  - continue with Q4 Neuro checks   - follow up echo with bubble study  - ophtho following- plan to be seen in ophtho clinic today before discharge     #Hyperkalemia   - K of 5.9 today  - s/p Insulin and Dextrose and Calcium Gluconate   - Repeat BMP        #Hx of Opiod dependance   - C/w Methadone treatment for hx of opioid misuse - takes 57mg daily at home  - has been tapering off with outpatient methadone clinic    #Misc:  - DVT ppx: lovenox  - Diet: regular  - Activity: IAT   - Dispo: discharge after optho appointment    No

## 2024-10-17 ENCOUNTER — OUTPATIENT (OUTPATIENT)
Dept: OUTPATIENT SERVICES | Facility: HOSPITAL | Age: 39
LOS: 1 days | End: 2024-10-17
Payer: MEDICAID

## 2024-10-17 DIAGNOSIS — Z00.8 ENCOUNTER FOR OTHER GENERAL EXAMINATION: ICD-10-CM

## 2024-10-17 LAB
A1C WITH ESTIMATED AVERAGE GLUCOSE RESULT: 5.2 % — SIGNIFICANT CHANGE UP (ref 4–5.6)
ALBUMIN SERPL ELPH-MCNC: 4.4 G/DL — SIGNIFICANT CHANGE UP (ref 3.5–5.2)
ALP SERPL-CCNC: 42 U/L — SIGNIFICANT CHANGE UP (ref 30–115)
ALT FLD-CCNC: 13 U/L — SIGNIFICANT CHANGE UP (ref 0–41)
ANION GAP SERPL CALC-SCNC: 11 MMOL/L — SIGNIFICANT CHANGE UP (ref 7–14)
AST SERPL-CCNC: 14 U/L — SIGNIFICANT CHANGE UP (ref 0–41)
BILIRUB SERPL-MCNC: 0.2 MG/DL — SIGNIFICANT CHANGE UP (ref 0.2–1.2)
BUN SERPL-MCNC: 15 MG/DL — SIGNIFICANT CHANGE UP (ref 10–20)
CALCIUM SERPL-MCNC: 9.2 MG/DL — SIGNIFICANT CHANGE UP (ref 8.4–10.5)
CHLORIDE SERPL-SCNC: 104 MMOL/L — SIGNIFICANT CHANGE UP (ref 98–110)
CHOLEST SERPL-MCNC: 171 MG/DL — SIGNIFICANT CHANGE UP
CO2 SERPL-SCNC: 24 MMOL/L — SIGNIFICANT CHANGE UP (ref 17–32)
CREAT SERPL-MCNC: 0.9 MG/DL — SIGNIFICANT CHANGE UP (ref 0.7–1.5)
EGFR: 85 ML/MIN/1.73M2 — SIGNIFICANT CHANGE UP
ESTIMATED AVERAGE GLUCOSE: 103 MG/DL — SIGNIFICANT CHANGE UP (ref 68–114)
GLUCOSE SERPL-MCNC: 111 MG/DL — HIGH (ref 70–99)
HCT VFR BLD CALC: 35.3 % — LOW (ref 37–47)
HDLC SERPL-MCNC: 66 MG/DL — SIGNIFICANT CHANGE UP
HGB BLD-MCNC: 12.1 G/DL — SIGNIFICANT CHANGE UP (ref 12–16)
LIPID PNL WITH DIRECT LDL SERPL: 90 MG/DL — SIGNIFICANT CHANGE UP
MAGNESIUM SERPL-MCNC: 1.9 MG/DL — SIGNIFICANT CHANGE UP (ref 1.8–2.4)
MCHC RBC-ENTMCNC: 30.4 PG — SIGNIFICANT CHANGE UP (ref 27–31)
MCHC RBC-ENTMCNC: 34.3 G/DL — SIGNIFICANT CHANGE UP (ref 32–37)
MCV RBC AUTO: 88.7 FL — SIGNIFICANT CHANGE UP (ref 81–99)
NON HDL CHOLESTEROL: 105 MG/DL — SIGNIFICANT CHANGE UP
NRBC # BLD: 0 /100 WBCS — SIGNIFICANT CHANGE UP (ref 0–0)
PLATELET # BLD AUTO: 212 K/UL — SIGNIFICANT CHANGE UP (ref 130–400)
PMV BLD: 9.8 FL — SIGNIFICANT CHANGE UP (ref 7.4–10.4)
POTASSIUM SERPL-MCNC: 3.8 MMOL/L — SIGNIFICANT CHANGE UP (ref 3.5–5)
POTASSIUM SERPL-SCNC: 3.8 MMOL/L — SIGNIFICANT CHANGE UP (ref 3.5–5)
PROT SERPL-MCNC: 6.6 G/DL — SIGNIFICANT CHANGE UP (ref 6–8)
RBC # BLD: 3.98 M/UL — LOW (ref 4.2–5.4)
RBC # FLD: 12.3 % — SIGNIFICANT CHANGE UP (ref 11.5–14.5)
SODIUM SERPL-SCNC: 139 MMOL/L — SIGNIFICANT CHANGE UP (ref 135–146)
TRIGL SERPL-MCNC: 77 MG/DL — SIGNIFICANT CHANGE UP
WBC # BLD: 5.01 K/UL — SIGNIFICANT CHANGE UP (ref 4.8–10.8)
WBC # FLD AUTO: 5.01 K/UL — SIGNIFICANT CHANGE UP (ref 4.8–10.8)

## 2024-10-17 PROCEDURE — 86480 TB TEST CELL IMMUN MEASURE: CPT

## 2024-10-17 PROCEDURE — 85027 COMPLETE CBC AUTOMATED: CPT

## 2024-10-17 PROCEDURE — 81025 URINE PREGNANCY TEST: CPT

## 2024-10-17 PROCEDURE — 86780 TREPONEMA PALLIDUM: CPT

## 2024-10-17 PROCEDURE — 81003 URINALYSIS AUTO W/O SCOPE: CPT

## 2024-10-17 PROCEDURE — 80061 LIPID PANEL: CPT

## 2024-10-17 PROCEDURE — 83036 HEMOGLOBIN GLYCOSYLATED A1C: CPT

## 2024-10-17 PROCEDURE — 80053 COMPREHEN METABOLIC PANEL: CPT

## 2024-10-17 PROCEDURE — 83735 ASSAY OF MAGNESIUM: CPT

## 2024-10-17 PROCEDURE — 80074 ACUTE HEPATITIS PANEL: CPT

## 2024-10-18 DIAGNOSIS — Z00.8 ENCOUNTER FOR OTHER GENERAL EXAMINATION: ICD-10-CM

## 2024-10-18 LAB
APPEARANCE UR: CLEAR — SIGNIFICANT CHANGE UP
BILIRUB UR-MCNC: NEGATIVE — SIGNIFICANT CHANGE UP
COLOR SPEC: YELLOW — SIGNIFICANT CHANGE UP
DIFF PNL FLD: NEGATIVE — SIGNIFICANT CHANGE UP
GLUCOSE UR QL: NEGATIVE MG/DL — SIGNIFICANT CHANGE UP
HCG UR QL: NEGATIVE — SIGNIFICANT CHANGE UP
KETONES UR-MCNC: NEGATIVE MG/DL — SIGNIFICANT CHANGE UP
LEUKOCYTE ESTERASE UR-ACNC: NEGATIVE — SIGNIFICANT CHANGE UP
NITRITE UR-MCNC: NEGATIVE — SIGNIFICANT CHANGE UP
PH UR: 6 — SIGNIFICANT CHANGE UP (ref 5–8)
PROT UR-MCNC: NEGATIVE MG/DL — SIGNIFICANT CHANGE UP
SP GR SPEC: 1.02 — SIGNIFICANT CHANGE UP (ref 1–1.03)
T PALLIDUM AB TITR SER: NEGATIVE — SIGNIFICANT CHANGE UP
UROBILINOGEN FLD QL: 0.2 MG/DL — SIGNIFICANT CHANGE UP (ref 0.2–1)

## 2024-10-19 LAB
HAV IGM SER-ACNC: SIGNIFICANT CHANGE UP
HBV CORE IGM SER-ACNC: SIGNIFICANT CHANGE UP
HBV SURFACE AG SER-ACNC: SIGNIFICANT CHANGE UP
HCV AB S/CO SERPL IA: 0.12 S/CO — SIGNIFICANT CHANGE UP (ref 0–0.99)
HCV AB SERPL-IMP: SIGNIFICANT CHANGE UP

## 2024-10-20 LAB
GAMMA INTERFERON BACKGROUND BLD IA-ACNC: 0.03 IU/ML — SIGNIFICANT CHANGE UP
M TB IFN-G BLD-IMP: NEGATIVE — SIGNIFICANT CHANGE UP
M TB IFN-G CD4+ BCKGRND COR BLD-ACNC: 0 IU/ML — SIGNIFICANT CHANGE UP
M TB IFN-G CD4+CD8+ BCKGRND COR BLD-ACNC: 0 IU/ML — SIGNIFICANT CHANGE UP
QUANT TB PLUS MITOGEN MINUS NIL: 1.45 IU/ML — SIGNIFICANT CHANGE UP

## 2024-11-29 ENCOUNTER — OUTPATIENT (OUTPATIENT)
Dept: OUTPATIENT SERVICES | Facility: HOSPITAL | Age: 39
LOS: 1 days | End: 2024-11-29
Payer: MEDICAID

## 2024-11-29 DIAGNOSIS — I49.9 CARDIAC ARRHYTHMIA, UNSPECIFIED: ICD-10-CM

## 2024-11-29 PROCEDURE — 93005 ELECTROCARDIOGRAM TRACING: CPT

## 2024-11-29 PROCEDURE — 93010 ELECTROCARDIOGRAM REPORT: CPT

## 2024-11-30 DIAGNOSIS — I49.9 CARDIAC ARRHYTHMIA, UNSPECIFIED: ICD-10-CM

## 2024-12-30 NOTE — ED PROVIDER NOTE - ATTENDING CONTRIBUTION TO CARE
Yes
35y female h/o coivd in march 2021 with f/c/myalgias/n/v 4d after first covid vax, PE as above, will provide supportive care, routine swab (unvaccinated bf with hot/cold flashes)

## 2025-03-19 NOTE — END OF VISIT
Copied from CRM #[id number]. Topic:  Schedule Appointment -  Schedule Primary Care  >> Mar 19, 2025  2:12 PM Kikesinan PAUL wrote:  Merlene called requesting to schedule a primary care visit with a Clinician. Checked insurance.  Looked for any active requests, recalls, service to orders, scheduling tickets prior to scheduling. The decision tree DT PC Was used for scheduling (an)     Non-Acute need. Not scheduled and followed instructions to message or transfer. Sent a message. Selected 'Wrap up CRM' and created new Telephone Encounter after clicking 'Convert to Clinical Call'. Selected reason for call 'Appointment'. Sent 'Appointment' template and routed as routine priority per Clinician KB page to appropriate clinician pool.    -- DO NOT REPLY / DO NOT REPLY ALL --  -- This inbox is not monitored. If this was sent to the wrong provider or department, reroute message to P E[facility] Reroute pool. --  -- Message is from [facility] Operations ([facility]) --  Reason for Appointment Message: TCM post hospital follow up - PCP unavailable in 14- calendar days    Reason for Visit: tcm    Is the patient currently scheduled? No    Preferred time to be seen: open    Caller Information       Contact Date/Time Type Contact Phone/Fax    03/19/2025 02:10 PM CDT Phone (Incoming) Merlene [phone]     [facility]            Alternative phone number: n/a    Can a detailed message be left?  Yes - Voicemail   Patient has been advised the message will be addressed within 2-3 business days            [Time Spent: ___ minutes] : I have spent [unfilled] minutes of time on the encounter.